# Patient Record
Sex: FEMALE | Race: WHITE | NOT HISPANIC OR LATINO | Employment: UNEMPLOYED | URBAN - METROPOLITAN AREA
[De-identification: names, ages, dates, MRNs, and addresses within clinical notes are randomized per-mention and may not be internally consistent; named-entity substitution may affect disease eponyms.]

---

## 2017-11-09 ENCOUNTER — HOSPITAL ENCOUNTER (EMERGENCY)
Facility: HOSPITAL | Age: 27
Discharge: HOME | End: 2017-11-10
Attending: EMERGENCY MEDICINE | Admitting: EMERGENCY MEDICINE

## 2017-11-09 DIAGNOSIS — R07.89 CHEST WALL PAIN: Primary | ICD-10-CM

## 2017-11-09 DIAGNOSIS — J20.9 ACUTE BRONCHITIS, UNSPECIFIED ORGANISM: ICD-10-CM

## 2017-11-09 LAB
ALBUMIN SERPL-MCNC: 4.4 G/DL (ref 3.2–4.8)
ALBUMIN/GLOB SERPL: 1.6 G/DL (ref 1.5–2.5)
ALP SERPL-CCNC: 68 U/L (ref 25–100)
ALT SERPL W P-5'-P-CCNC: 13 U/L (ref 7–40)
ANION GAP SERPL CALCULATED.3IONS-SCNC: 3 MMOL/L (ref 3–11)
AST SERPL-CCNC: 19 U/L (ref 0–33)
BASOPHILS # BLD AUTO: 0.02 10*3/MM3 (ref 0–0.2)
BASOPHILS NFR BLD AUTO: 0.2 % (ref 0–1)
BILIRUB SERPL-MCNC: 0.3 MG/DL (ref 0.3–1.2)
BUN BLD-MCNC: 12 MG/DL (ref 9–23)
BUN/CREAT SERPL: 15 (ref 7–25)
CALCIUM SPEC-SCNC: 9.2 MG/DL (ref 8.7–10.4)
CHLORIDE SERPL-SCNC: 106 MMOL/L (ref 99–109)
CO2 SERPL-SCNC: 29 MMOL/L (ref 20–31)
CREAT BLD-MCNC: 0.8 MG/DL (ref 0.6–1.3)
DEPRECATED RDW RBC AUTO: 46 FL (ref 37–54)
EOSINOPHIL # BLD AUTO: 0.3 10*3/MM3 (ref 0–0.3)
EOSINOPHIL NFR BLD AUTO: 2.4 % (ref 0–3)
ERYTHROCYTE [DISTWIDTH] IN BLOOD BY AUTOMATED COUNT: 13.3 % (ref 11.3–14.5)
GFR SERPL CREATININE-BSD FRML MDRD: 87 ML/MIN/1.73
GLOBULIN UR ELPH-MCNC: 2.7 GM/DL
GLUCOSE BLD-MCNC: 89 MG/DL (ref 70–100)
HCT VFR BLD AUTO: 46.1 % (ref 34.5–44)
HGB BLD-MCNC: 15.6 G/DL (ref 11.5–15.5)
HOLD SPECIMEN: NORMAL
HOLD SPECIMEN: NORMAL
IMM GRANULOCYTES # BLD: 0.02 10*3/MM3 (ref 0–0.03)
IMM GRANULOCYTES NFR BLD: 0.2 % (ref 0–0.6)
LYMPHOCYTES # BLD AUTO: 5.45 10*3/MM3 (ref 0.6–4.8)
LYMPHOCYTES NFR BLD AUTO: 44.5 % (ref 24–44)
MCH RBC QN AUTO: 31.7 PG (ref 27–31)
MCHC RBC AUTO-ENTMCNC: 33.8 G/DL (ref 32–36)
MCV RBC AUTO: 93.7 FL (ref 80–99)
MONOCYTES # BLD AUTO: 0.93 10*3/MM3 (ref 0–1)
MONOCYTES NFR BLD AUTO: 7.6 % (ref 0–12)
NEUTROPHILS # BLD AUTO: 5.54 10*3/MM3 (ref 1.5–8.3)
NEUTROPHILS NFR BLD AUTO: 45.1 % (ref 41–71)
PLATELET # BLD AUTO: 287 10*3/MM3 (ref 150–450)
PMV BLD AUTO: 10.3 FL (ref 6–12)
POTASSIUM BLD-SCNC: 4.2 MMOL/L (ref 3.5–5.5)
PROT SERPL-MCNC: 7.1 G/DL (ref 5.7–8.2)
RBC # BLD AUTO: 4.92 10*6/MM3 (ref 3.89–5.14)
SODIUM BLD-SCNC: 138 MMOL/L (ref 132–146)
WBC NRBC COR # BLD: 12.26 10*3/MM3 (ref 3.5–10.8)
WHOLE BLOOD HOLD SPECIMEN: NORMAL
WHOLE BLOOD HOLD SPECIMEN: NORMAL

## 2017-11-09 PROCEDURE — 99284 EMERGENCY DEPT VISIT MOD MDM: CPT

## 2017-11-09 PROCEDURE — 93005 ELECTROCARDIOGRAM TRACING: CPT

## 2017-11-09 PROCEDURE — 80053 COMPREHEN METABOLIC PANEL: CPT | Performed by: EMERGENCY MEDICINE

## 2017-11-09 PROCEDURE — 85025 COMPLETE CBC W/AUTO DIFF WBC: CPT | Performed by: EMERGENCY MEDICINE

## 2017-11-09 RX ORDER — SODIUM CHLORIDE 0.9 % (FLUSH) 0.9 %
10 SYRINGE (ML) INJECTION AS NEEDED
Status: DISCONTINUED | OUTPATIENT
Start: 2017-11-09 | End: 2017-11-10 | Stop reason: HOSPADM

## 2017-11-10 ENCOUNTER — APPOINTMENT (OUTPATIENT)
Dept: GENERAL RADIOLOGY | Facility: HOSPITAL | Age: 27
End: 2017-11-10

## 2017-11-10 VITALS
HEIGHT: 67 IN | DIASTOLIC BLOOD PRESSURE: 82 MMHG | WEIGHT: 293 LBS | SYSTOLIC BLOOD PRESSURE: 146 MMHG | TEMPERATURE: 98.5 F | RESPIRATION RATE: 18 BRPM | BODY MASS INDEX: 45.99 KG/M2 | OXYGEN SATURATION: 96 % | HEART RATE: 87 BPM

## 2017-11-10 LAB
B-HCG UR QL: NEGATIVE
BILIRUB UR QL STRIP: NEGATIVE
CLARITY UR: CLEAR
COLOR UR: YELLOW
GLUCOSE UR STRIP-MCNC: NEGATIVE MG/DL
HGB UR QL STRIP.AUTO: NEGATIVE
INTERNAL NEGATIVE CONTROL: NEGATIVE
INTERNAL POSITIVE CONTROL: POSITIVE
KETONES UR QL STRIP: NEGATIVE
LEUKOCYTE ESTERASE UR QL STRIP.AUTO: NEGATIVE
Lab: NORMAL
NITRITE UR QL STRIP: NEGATIVE
PH UR STRIP.AUTO: 7 [PH] (ref 5–8)
PROT UR QL STRIP: NEGATIVE
SP GR UR STRIP: 1.02 (ref 1–1.03)
UROBILINOGEN UR QL STRIP: NORMAL

## 2017-11-10 PROCEDURE — 71020 HC CHEST PA AND LATERAL: CPT

## 2017-11-10 PROCEDURE — 96374 THER/PROPH/DIAG INJ IV PUSH: CPT

## 2017-11-10 PROCEDURE — 25010000002 KETOROLAC TROMETHAMINE PER 15 MG: Performed by: PHYSICIAN ASSISTANT

## 2017-11-10 PROCEDURE — 81003 URINALYSIS AUTO W/O SCOPE: CPT | Performed by: EMERGENCY MEDICINE

## 2017-11-10 RX ORDER — KETOROLAC TROMETHAMINE 15 MG/ML
15 INJECTION, SOLUTION INTRAMUSCULAR; INTRAVENOUS ONCE
Status: COMPLETED | OUTPATIENT
Start: 2017-11-10 | End: 2017-11-10

## 2017-11-10 RX ORDER — AZITHROMYCIN 250 MG/1
500 TABLET, FILM COATED ORAL DAILY
Qty: 6 TABLET | Refills: 0 | Status: SHIPPED | OUTPATIENT
Start: 2017-11-10 | End: 2021-03-29

## 2017-11-10 RX ORDER — NAPROXEN 500 MG/1
500 TABLET ORAL 2 TIMES DAILY PRN
Qty: 14 TABLET | Refills: 0 | Status: SHIPPED | OUTPATIENT
Start: 2017-11-10 | End: 2021-03-29

## 2017-11-10 RX ADMIN — KETOROLAC TROMETHAMINE 15 MG: 15 INJECTION, SOLUTION INTRAMUSCULAR; INTRAVENOUS at 01:12

## 2017-11-10 NOTE — ED PROVIDER NOTES
Subjective   HPI Comments: Geeta Arteaga is a 27 y.o. female who presents to the ED with c/o substernal chest pain with onset today. The patient reports that the pain radiates into her left chest and it is described as a heavy and sharp sensation. She notes that she had a similar episode 1 week ago, but this was resolved after she took a nap. The pain is unaffected with deep breathing and she also complains of associated shortness of breath which is exacerbated with exertion. She states that the pain was mostly resolved upon examination. She also complains of a cough that produces clear white material, rhinorrhea, and congestion. She has tried taking OTC congestion medication with moderate relief of these symptoms. She denies any abdominal pain, nausea, urinary sx, problems with her bowel movements, or any other complaints at this time. The patient has no hx of asthma, but states that she does smoke 1 pack a day. She has not gotten her flu shot.    Patient is a 27 y.o. female presenting with chest pain.   History provided by:  Patient  Chest Pain   Pain location:  Substernal area  Pain radiates to:  Does not radiate  Onset quality:  Sudden  Duration: Onset tonight.  Timing:  Constant  Progression:  Partially resolved  Chronicity:  New  Relieved by:  None tried  Worsened by:  Nothing  Ineffective treatments:  None tried  Associated symptoms: cough (Productive), dizziness and shortness of breath    Associated symptoms: no abdominal pain, no fever and no nausea    Risk factors: smoking        Review of Systems   Constitutional: Negative for chills and fever.   HENT: Positive for congestion and rhinorrhea.    Respiratory: Positive for cough (Productive) and shortness of breath.    Cardiovascular: Positive for chest pain.   Gastrointestinal: Negative for abdominal pain, constipation, diarrhea and nausea.   Genitourinary: Negative for difficulty urinating, dysuria, frequency and urgency.   Neurological: Positive for  dizziness.   All other systems reviewed and are negative.      Past Medical History:   Diagnosis Date   • PCOS (polycystic ovarian syndrome)        No Known Allergies    History reviewed. No pertinent surgical history.    History reviewed. No pertinent family history.    Social History     Social History   • Marital status: Single     Spouse name: N/A   • Number of children: N/A   • Years of education: N/A     Social History Main Topics   • Smoking status: Current Every Day Smoker     Packs/day: 1.00     Types: Cigarettes   • Smokeless tobacco: Never Used   • Alcohol use No   • Drug use: No   • Sexual activity: Not Asked     Other Topics Concern   • None     Social History Narrative   • None         Objective   Physical Exam   Constitutional: She is oriented to person, place, and time. She appears well-developed and well-nourished. No distress.   Patient had poor hygiene   HENT:   Head: Normocephalic and atraumatic.   Right Ear: External ear normal.   Left Ear: External ear normal.   Nose: Nose normal. No sinus tenderness. Right sinus exhibits no maxillary sinus tenderness and no frontal sinus tenderness. Left sinus exhibits no maxillary sinus tenderness and no frontal sinus tenderness.   Mouth/Throat: Oropharynx is clear and moist.   Eyes: Conjunctivae are normal. Pupils are equal, round, and reactive to light. No scleral icterus.   Neck: Normal range of motion. Neck supple.   Cardiovascular: Normal rate, regular rhythm and normal heart sounds.    No murmur heard.  Pulmonary/Chest: Effort normal and breath sounds normal. No respiratory distress. She has no wheezes. She has no rhonchi. She has no rales. She exhibits tenderness (Point tenderness to the substernal region).   Lungs are clear bilaterally to ausculation. Chest wall has no skin lesions or rash.    Abdominal: Soft. Bowel sounds are normal.   Musculoskeletal: Normal range of motion. She exhibits no edema.   Neurological: She is alert and oriented to person,  place, and time.   Skin: Skin is warm and dry.   Psychiatric: She has a normal mood and affect. Her behavior is normal.   Nursing note and vitals reviewed.      Procedures         ED Course  ED Course   Comment By Time   Patient reports that Toradol did help improve her chest wall pain.  She also had a deep, loose cough after I examined her.  With her being a smoker and having 1 week history of congestion, I'm going to prescribe her a Z-Edward for acute bronchitis.  Zithromax will also help due to its anti-inflammatory properties. Radha Quintero PA-C 11/10 4719     Recent Results (from the past 24 hour(s))   Comprehensive Metabolic Panel    Collection Time: 11/09/17 10:42 PM   Result Value Ref Range    Glucose 89 70 - 100 mg/dL    BUN 12 9 - 23 mg/dL    Creatinine 0.80 0.60 - 1.30 mg/dL    Sodium 138 132 - 146 mmol/L    Potassium 4.2 3.5 - 5.5 mmol/L    Chloride 106 99 - 109 mmol/L    CO2 29.0 20.0 - 31.0 mmol/L    Calcium 9.2 8.7 - 10.4 mg/dL    Total Protein 7.1 5.7 - 8.2 g/dL    Albumin 4.40 3.20 - 4.80 g/dL    ALT (SGPT) 13 7 - 40 U/L    AST (SGOT) 19 0 - 33 U/L    Alkaline Phosphatase 68 25 - 100 U/L    Total Bilirubin 0.3 0.3 - 1.2 mg/dL    eGFR Non African Amer 87 >60 mL/min/1.73    Globulin 2.7 gm/dL    A/G Ratio 1.6 1.5 - 2.5 g/dL    BUN/Creatinine Ratio 15.0 7.0 - 25.0    Anion Gap 3.0 3.0 - 11.0 mmol/L   Light Blue Top    Collection Time: 11/09/17 10:42 PM   Result Value Ref Range    Extra Tube hold for add-on    Green Top (Gel)    Collection Time: 11/09/17 10:42 PM   Result Value Ref Range    Extra Tube Hold for add-ons.    Lavender Top    Collection Time: 11/09/17 10:42 PM   Result Value Ref Range    Extra Tube hold for add-on    Gold Top - SST    Collection Time: 11/09/17 10:42 PM   Result Value Ref Range    Extra Tube Hold for add-ons.    CBC Auto Differential    Collection Time: 11/09/17 10:42 PM   Result Value Ref Range    WBC 12.26 (H) 3.50 - 10.80 10*3/mm3    RBC 4.92 3.89 - 5.14 10*6/mm3     Hemoglobin 15.6 (H) 11.5 - 15.5 g/dL    Hematocrit 46.1 (H) 34.5 - 44.0 %    MCV 93.7 80.0 - 99.0 fL    MCH 31.7 (H) 27.0 - 31.0 pg    MCHC 33.8 32.0 - 36.0 g/dL    RDW 13.3 11.3 - 14.5 %    RDW-SD 46.0 37.0 - 54.0 fl    MPV 10.3 6.0 - 12.0 fL    Platelets 287 150 - 450 10*3/mm3    Neutrophil % 45.1 41.0 - 71.0 %    Lymphocyte % 44.5 (H) 24.0 - 44.0 %    Monocyte % 7.6 0.0 - 12.0 %    Eosinophil % 2.4 0.0 - 3.0 %    Basophil % 0.2 0.0 - 1.0 %    Immature Grans % 0.2 0.0 - 0.6 %    Neutrophils, Absolute 5.54 1.50 - 8.30 10*3/mm3    Lymphocytes, Absolute 5.45 (H) 0.60 - 4.80 10*3/mm3    Monocytes, Absolute 0.93 0.00 - 1.00 10*3/mm3    Eosinophils, Absolute 0.30 0.00 - 0.30 10*3/mm3    Basophils, Absolute 0.02 0.00 - 0.20 10*3/mm3    Immature Grans, Absolute 0.02 0.00 - 0.03 10*3/mm3   Urinalysis With / Culture If Indicated - Urine, Clean Catch    Collection Time: 11/10/17 12:43 AM   Result Value Ref Range    Color, UA Yellow Yellow, Straw    Appearance, UA Clear Clear    pH, UA 7.0 5.0 - 8.0    Specific Gravity, UA 1.020 1.001 - 1.030    Glucose, UA Negative Negative    Ketones, UA Negative Negative    Bilirubin, UA Negative Negative    Blood, UA Negative Negative    Protein, UA Negative Negative    Leuk Esterase, UA Negative Negative    Nitrite, UA Negative Negative    Urobilinogen, UA 1.0 E.U./dL 0.2 - 1.0 E.U./dL   POCT pregnancy, urine    Collection Time: 11/10/17 12:48 AM   Result Value Ref Range    HCG, Urine, QL Negative Negative    Lot Number [id number]     Internal Positive Control Positive     Internal Negative Control Negative      Note: In addition to lab results from this visit, the labs listed above may include labs taken at another facility or during a different encounter within the last 24 hours. Please correlate lab times with ED admission and discharge times for further clarification of the services performed during this visit.    XR Chest 2 View   Final Result      1. No acute findings.       2. Non-acute findings are described above.      THIS DOCUMENT HAS BEEN ELECTRONICALLY SIGNED BY DESTINEE AYON MD        Vitals:    11/09/17 2354 11/10/17 0000 11/10/17 0003 11/10/17 0100   BP:  125/68  146/82   BP Location:       Patient Position:       Pulse: 87  87    Resp:       Temp:       TempSrc:       SpO2: 96%  96%    Weight:       Height:         Medications   sodium chloride 0.9 % flush 10 mL (not administered)   ketorolac (TORADOL) injection 15 mg (15 mg Intravenous Given 11/10/17 0112)     ECG/EMG Results (last 24 hours)     Procedure Component Value Units Date/Time    ECG 12 Lead [[id number]] Collected:  11/09/17 2238     Updated:  11/09/17 2239                        MDM    Final diagnoses:   Chest wall pain   Acute bronchitis, unspecified organism       Documentation assistance provided by phyllis Murray.  Information recorded by the scribe was done at my direction and has been verified and validated by me.     Fredis Murray  11/10/17 0121       Fredis Murray  11/10/17 0121       Radha Quintero PA-C  11/10/17 0238

## 2017-11-10 NOTE — DISCHARGE INSTRUCTIONS
Chest x-ray shows no evidence of acute pneumonia.  Patient has had respiratory symptoms ×1 week duration.  With deep, loose cough, as well as tobacco dependence, we are going to cover patient with a Z-Edward for acute bronchitis.  The Zithromax will also help due to its anti-inflammatory properties.  I also prescribed naproxen 500 mg by mouth twice daily for chest wall pain.  Recommend close follow-up with primary care physician for recheck in 1-2 days.  We will give patient a list of excepting physicians in the local area.  Return if worsening symptoms.

## 2020-11-19 ENCOUNTER — HOSPITAL ENCOUNTER (EMERGENCY)
Facility: HOSPITAL | Age: 30
Discharge: HOME | End: 2020-11-19
Attending: EMERGENCY MEDICINE | Admitting: EMERGENCY MEDICINE

## 2020-11-19 ENCOUNTER — APPOINTMENT (OUTPATIENT)
Dept: GENERAL RADIOLOGY | Facility: HOSPITAL | Age: 30
End: 2020-11-19

## 2020-11-19 VITALS
HEART RATE: 78 BPM | OXYGEN SATURATION: 98 % | SYSTOLIC BLOOD PRESSURE: 152 MMHG | TEMPERATURE: 98.9 F | BODY MASS INDEX: 44.41 KG/M2 | HEIGHT: 68 IN | DIASTOLIC BLOOD PRESSURE: 70 MMHG | WEIGHT: 293 LBS | RESPIRATION RATE: 18 BRPM

## 2020-11-19 DIAGNOSIS — B34.9 VIRAL SYNDROME: Primary | ICD-10-CM

## 2020-11-19 LAB
ALBUMIN SERPL-MCNC: 4.2 G/DL (ref 3.5–5.2)
ALBUMIN/GLOB SERPL: 1.8 G/DL
ALP SERPL-CCNC: 69 U/L (ref 39–117)
ALT SERPL W P-5'-P-CCNC: 9 U/L (ref 1–33)
ANION GAP SERPL CALCULATED.3IONS-SCNC: 10 MMOL/L (ref 5–15)
AST SERPL-CCNC: 15 U/L (ref 1–32)
B PARAPERT DNA SPEC QL NAA+PROBE: NOT DETECTED
B PERT DNA SPEC QL NAA+PROBE: NOT DETECTED
B-HCG UR QL: NEGATIVE
BASOPHILS # BLD AUTO: 0.03 10*3/MM3 (ref 0–0.2)
BASOPHILS NFR BLD AUTO: 0.3 % (ref 0–1.5)
BILIRUB SERPL-MCNC: 0.4 MG/DL (ref 0–1.2)
BUN SERPL-MCNC: 10 MG/DL (ref 6–20)
BUN/CREAT SERPL: 13.2 (ref 7–25)
C PNEUM DNA NPH QL NAA+NON-PROBE: NOT DETECTED
CALCIUM SPEC-SCNC: 9.3 MG/DL (ref 8.6–10.5)
CHLORIDE SERPL-SCNC: 103 MMOL/L (ref 98–107)
CO2 SERPL-SCNC: 27 MMOL/L (ref 22–29)
CREAT SERPL-MCNC: 0.76 MG/DL (ref 0.57–1)
DEPRECATED RDW RBC AUTO: 43.8 FL (ref 37–54)
EOSINOPHIL # BLD AUTO: 0.24 10*3/MM3 (ref 0–0.4)
EOSINOPHIL NFR BLD AUTO: 2.3 % (ref 0.3–6.2)
ERYTHROCYTE [DISTWIDTH] IN BLOOD BY AUTOMATED COUNT: 12.8 % (ref 12.3–15.4)
FLUAV H1 2009 PAND RNA NPH QL NAA+PROBE: NOT DETECTED
FLUAV H1 HA GENE NPH QL NAA+PROBE: NOT DETECTED
FLUAV H3 RNA NPH QL NAA+PROBE: NOT DETECTED
FLUAV SUBTYP SPEC NAA+PROBE: NOT DETECTED
FLUBV RNA ISLT QL NAA+PROBE: NOT DETECTED
GFR SERPL CREATININE-BSD FRML MDRD: 91 ML/MIN/1.73
GLOBULIN UR ELPH-MCNC: 2.4 GM/DL
GLUCOSE SERPL-MCNC: 121 MG/DL (ref 65–99)
HADV DNA SPEC NAA+PROBE: NOT DETECTED
HCOV 229E RNA SPEC QL NAA+PROBE: NOT DETECTED
HCOV HKU1 RNA SPEC QL NAA+PROBE: NOT DETECTED
HCOV NL63 RNA SPEC QL NAA+PROBE: NOT DETECTED
HCOV OC43 RNA SPEC QL NAA+PROBE: NOT DETECTED
HCT VFR BLD AUTO: 47.9 % (ref 34–46.6)
HGB BLD-MCNC: 15.7 G/DL (ref 12–15.9)
HMPV RNA NPH QL NAA+NON-PROBE: NOT DETECTED
HOLD SPECIMEN: NORMAL
HOLD SPECIMEN: NORMAL
HPIV1 RNA SPEC QL NAA+PROBE: NOT DETECTED
HPIV2 RNA SPEC QL NAA+PROBE: NOT DETECTED
HPIV3 RNA NPH QL NAA+PROBE: NOT DETECTED
HPIV4 P GENE NPH QL NAA+PROBE: NOT DETECTED
IMM GRANULOCYTES # BLD AUTO: 0.03 10*3/MM3 (ref 0–0.05)
IMM GRANULOCYTES NFR BLD AUTO: 0.3 % (ref 0–0.5)
INTERNAL NEGATIVE CONTROL: NEGATIVE
INTERNAL POSITIVE CONTROL: POSITIVE
LYMPHOCYTES # BLD AUTO: 3.24 10*3/MM3 (ref 0.7–3.1)
LYMPHOCYTES NFR BLD AUTO: 31.4 % (ref 19.6–45.3)
Lab: NORMAL
M PNEUMO IGG SER IA-ACNC: NOT DETECTED
MCH RBC QN AUTO: 30.7 PG (ref 26.6–33)
MCHC RBC AUTO-ENTMCNC: 32.8 G/DL (ref 31.5–35.7)
MCV RBC AUTO: 93.6 FL (ref 79–97)
MONOCYTES # BLD AUTO: 0.65 10*3/MM3 (ref 0.1–0.9)
MONOCYTES NFR BLD AUTO: 6.3 % (ref 5–12)
NEUTROPHILS NFR BLD AUTO: 59.4 % (ref 42.7–76)
NEUTROPHILS NFR BLD AUTO: 6.12 10*3/MM3 (ref 1.7–7)
NRBC BLD AUTO-RTO: 0 /100 WBC (ref 0–0.2)
NT-PROBNP SERPL-MCNC: <5 PG/ML (ref 0–450)
PLATELET # BLD AUTO: 247 10*3/MM3 (ref 140–450)
PMV BLD AUTO: 10.1 FL (ref 6–12)
POTASSIUM SERPL-SCNC: 4.3 MMOL/L (ref 3.5–5.2)
PROT SERPL-MCNC: 6.6 G/DL (ref 6–8.5)
RBC # BLD AUTO: 5.12 10*6/MM3 (ref 3.77–5.28)
RHINOVIRUS RNA SPEC NAA+PROBE: NOT DETECTED
RSV RNA NPH QL NAA+NON-PROBE: NOT DETECTED
SARS-COV-2 RNA NPH QL NAA+NON-PROBE: NOT DETECTED
SODIUM SERPL-SCNC: 140 MMOL/L (ref 136–145)
TROPONIN T SERPL-MCNC: <0.01 NG/ML (ref 0–0.03)
WBC # BLD AUTO: 10.31 10*3/MM3 (ref 3.4–10.8)
WHOLE BLOOD HOLD SPECIMEN: NORMAL
WHOLE BLOOD HOLD SPECIMEN: NORMAL

## 2020-11-19 PROCEDURE — 25010000002 KETOROLAC TROMETHAMINE PER 15 MG: Performed by: EMERGENCY MEDICINE

## 2020-11-19 PROCEDURE — 80053 COMPREHEN METABOLIC PANEL: CPT

## 2020-11-19 PROCEDURE — 71045 X-RAY EXAM CHEST 1 VIEW: CPT

## 2020-11-19 PROCEDURE — 81025 URINE PREGNANCY TEST: CPT

## 2020-11-19 PROCEDURE — 99284 EMERGENCY DEPT VISIT MOD MDM: CPT

## 2020-11-19 PROCEDURE — 93005 ELECTROCARDIOGRAM TRACING: CPT

## 2020-11-19 PROCEDURE — 84484 ASSAY OF TROPONIN QUANT: CPT

## 2020-11-19 PROCEDURE — 83880 ASSAY OF NATRIURETIC PEPTIDE: CPT

## 2020-11-19 PROCEDURE — 85025 COMPLETE CBC W/AUTO DIFF WBC: CPT

## 2020-11-19 PROCEDURE — 96374 THER/PROPH/DIAG INJ IV PUSH: CPT

## 2020-11-19 PROCEDURE — 0202U NFCT DS 22 TRGT SARS-COV-2: CPT | Performed by: EMERGENCY MEDICINE

## 2020-11-19 RX ORDER — SODIUM CHLORIDE 0.9 % (FLUSH) 0.9 %
10 SYRINGE (ML) INJECTION AS NEEDED
Status: DISCONTINUED | OUTPATIENT
Start: 2020-11-19 | End: 2020-11-19 | Stop reason: HOSPADM

## 2020-11-19 RX ORDER — KETOROLAC TROMETHAMINE 30 MG/ML
30 INJECTION, SOLUTION INTRAMUSCULAR; INTRAVENOUS ONCE
Status: COMPLETED | OUTPATIENT
Start: 2020-11-19 | End: 2020-11-19

## 2020-11-19 RX ORDER — HYDROCODONE BITARTRATE AND ACETAMINOPHEN 7.5; 325 MG/1; MG/1
1 TABLET ORAL ONCE
Status: COMPLETED | OUTPATIENT
Start: 2020-11-19 | End: 2020-11-19

## 2020-11-19 RX ADMIN — SODIUM CHLORIDE 1000 ML: 9 INJECTION, SOLUTION INTRAVENOUS at 09:56

## 2020-11-19 RX ADMIN — HYDROCODONE BITARTRATE AND ACETAMINOPHEN 1 TABLET: 7.5; 325 TABLET ORAL at 09:56

## 2020-11-19 RX ADMIN — KETOROLAC TROMETHAMINE 30 MG: 30 INJECTION, SOLUTION INTRAMUSCULAR at 09:56

## 2020-11-19 NOTE — DISCHARGE INSTRUCTIONS
Home to rest.  Maintain your very best hydration and nutrition.  Tylenol 650 mg every 4 hours for discomfort.  Below is a list of local providers with whom you may establish a relationship.  Thank you.

## 2020-11-19 NOTE — ED PROVIDER NOTES
Subjective   Geeta Arteaga is a 30 y.o. female who presents to the ED with c/o shortness of breath. The patient is concerned about being positive for COVID-19, as for the past week she has been ill with shortness of breath and myalgia. She reports having a fever of 101 last night with some associated chills, but she has been controlling her febrile temperature with Ibuprofen. The patient complains of rib pain when she coughs and diarrhea but denies loss of taste or smell, chest pain, nausea, vomiting, dysuria, hematochezia, and abdominal pain. She is a current everyday smoker, with approximately 1 pack of cigarettes daily smoked. There are no other acute complaints at this time.      History provided by:  Patient  Shortness of Breath  Severity:  Moderate  Onset quality:  Sudden  Duration:  1 week  Timing:  Constant  Progression:  Worsening  Chronicity:  New  Context: not emotional upset, not fumes and not occupational exposure    Relieved by:  Nothing  Worsened by:  Coughing and exertion  Ineffective treatments:  Rest  Associated symptoms: cough and fever    Associated symptoms: no abdominal pain, no chest pain and no vomiting    Risk factors: alcohol use and tobacco use    Risk factors: no hx of cancer, no hx of PE/DVT and no obesity        Review of Systems   Constitutional: Positive for chills and fever.   HENT:        She denies loss of taste or smell.   Respiratory: Positive for cough and shortness of breath.    Cardiovascular: Negative for chest pain.   Gastrointestinal: Positive for diarrhea. Negative for abdominal pain, blood in stool, nausea and vomiting.   Genitourinary: Negative for decreased urine volume, difficulty urinating, dysuria, frequency, hematuria and urgency.   Musculoskeletal: Positive for myalgias.        The patient complains of rib pain when coughing.   All other systems reviewed and are negative.      Past Medical History:   Diagnosis Date   • PCOS (polycystic ovarian syndrome)        No  Known Allergies    No past surgical history on file.    No family history on file.    Social History     Socioeconomic History   • Marital status:      Spouse name: Not on file   • Number of children: Not on file   • Years of education: Not on file   • Highest education level: Not on file   Tobacco Use   • Smoking status: Current Every Day Smoker     Packs/day: 1.00     Types: Cigarettes   • Smokeless tobacco: Never Used   Substance and Sexual Activity   • Alcohol use: Yes   • Drug use: No         Objective   Physical Exam  Vitals signs and nursing note reviewed.   Constitutional:       General: She is not in acute distress.     Appearance: She is well-developed.      Comments: The patient is an excellent historian.   HENT:      Head: Normocephalic and atraumatic.   Eyes:      General: No scleral icterus.     Conjunctiva/sclera: Conjunctivae normal.   Neck:      Musculoskeletal: Normal range of motion and neck supple.   Cardiovascular:      Rate and Rhythm: Normal rate and regular rhythm.      Heart sounds: Normal heart sounds. No murmur.   Pulmonary:      Effort: Pulmonary effort is normal. No respiratory distress.      Breath sounds: Normal breath sounds.      Comments: The patient is oxygenating well, with an oxygen saturation of 100% on room air.  Abdominal:      Palpations: Abdomen is soft.      Tenderness: There is no abdominal tenderness.   Musculoskeletal: Normal range of motion.   Skin:     General: Skin is warm and dry.   Neurological:      Mental Status: She is alert and oriented to person, place, and time.   Psychiatric:         Behavior: Behavior normal.         Procedures         ED Course  ED Course as of Nov 19 1112   Thu Nov 19, 2020   1101 Patient's work-up is reassuring and negative for acute findings.  Her vital signs have been stable throughout her ED course.  We discussed follow-up with her primary care provider and parameters for concern that would warrant return to the emergency  department.    [MS]      ED Course User Index  [MS] Susana Balbuena Yeni, EDIE     Recent Results (from the past 24 hour(s))   Comprehensive Metabolic Panel    Collection Time: 11/19/20  9:08 AM    Specimen: Blood   Result Value Ref Range    Glucose 121 (H) 65 - 99 mg/dL    BUN 10 6 - 20 mg/dL    Creatinine 0.76 0.57 - 1.00 mg/dL    Sodium 140 136 - 145 mmol/L    Potassium 4.3 3.5 - 5.2 mmol/L    Chloride 103 98 - 107 mmol/L    CO2 27.0 22.0 - 29.0 mmol/L    Calcium 9.3 8.6 - 10.5 mg/dL    Total Protein 6.6 6.0 - 8.5 g/dL    Albumin 4.20 3.50 - 5.20 g/dL    ALT (SGPT) 9 1 - 33 U/L    AST (SGOT) 15 1 - 32 U/L    Alkaline Phosphatase 69 39 - 117 U/L    Total Bilirubin 0.4 0.0 - 1.2 mg/dL    eGFR Non African Amer 91 >60 mL/min/1.73    Globulin 2.4 gm/dL    A/G Ratio 1.8 g/dL    BUN/Creatinine Ratio 13.2 7.0 - 25.0    Anion Gap 10.0 5.0 - 15.0 mmol/L   BNP    Collection Time: 11/19/20  9:08 AM    Specimen: Blood   Result Value Ref Range    proBNP <5.0 0.0 - 450.0 pg/mL   Light Blue Top    Collection Time: 11/19/20  9:08 AM   Result Value Ref Range    Extra Tube hold for add-on    Green Top (Gel)    Collection Time: 11/19/20  9:08 AM   Result Value Ref Range    Extra Tube Hold for add-ons.    Lavender Top    Collection Time: 11/19/20  9:08 AM   Result Value Ref Range    Extra Tube hold for add-on    Gold Top - SST    Collection Time: 11/19/20  9:08 AM   Result Value Ref Range    Extra Tube Hold for add-ons.    CBC Auto Differential    Collection Time: 11/19/20  9:08 AM    Specimen: Blood   Result Value Ref Range    WBC 10.31 3.40 - 10.80 10*3/mm3    RBC 5.12 3.77 - 5.28 10*6/mm3    Hemoglobin 15.7 12.0 - 15.9 g/dL    Hematocrit 47.9 (H) 34.0 - 46.6 %    MCV 93.6 79.0 - 97.0 fL    MCH 30.7 26.6 - 33.0 pg    MCHC 32.8 31.5 - 35.7 g/dL    RDW 12.8 12.3 - 15.4 %    RDW-SD 43.8 37.0 - 54.0 fl    MPV 10.1 6.0 - 12.0 fL    Platelets 247 140 - 450 10*3/mm3    Neutrophil % 59.4 42.7 - 76.0 %    Lymphocyte % 31.4 19.6 - 45.3 %     Monocyte % 6.3 5.0 - 12.0 %    Eosinophil % 2.3 0.3 - 6.2 %    Basophil % 0.3 0.0 - 1.5 %    Immature Grans % 0.3 0.0 - 0.5 %    Neutrophils, Absolute 6.12 1.70 - 7.00 10*3/mm3    Lymphocytes, Absolute 3.24 (H) 0.70 - 3.10 10*3/mm3    Monocytes, Absolute 0.65 0.10 - 0.90 10*3/mm3    Eosinophils, Absolute 0.24 0.00 - 0.40 10*3/mm3    Basophils, Absolute 0.03 0.00 - 0.20 10*3/mm3    Immature Grans, Absolute 0.03 0.00 - 0.05 10*3/mm3    nRBC 0.0 0.0 - 0.2 /100 WBC   Troponin    Collection Time: 11/19/20 10:01 AM    Specimen: Blood   Result Value Ref Range    Troponin T <0.010 0.000 - 0.030 ng/mL   Respiratory Panel PCR w/COVID-19(SARS-CoV-2) [facility]/FELI In-House, NP Swab in [facility], 3-4 HR TAT - Swab, Nasopharynx    Collection Time: 11/19/20 10:01 AM    Specimen: Nasopharynx; Swab   Result Value Ref Range    ADENOVIRUS, PCR Not Detected Not Detected    Coronavirus 229E Not Detected Not Detected    Coronavirus HKU1 Not Detected Not Detected    Coronavirus NL63 Not Detected Not Detected    Coronavirus OC43 Not Detected Not Detected    COVID19 Not Detected Not Detected - Ref. Range    Human Metapneumovirus Not Detected Not Detected    Human Rhinovirus/Enterovirus Not Detected Not Detected    Influenza A PCR Not Detected Not Detected    Influenza A H1 Not Detected Not Detected    Influenza A H1 2009 PCR Not Detected Not Detected    Influenza A H3 Not Detected Not Detected    Influenza B PCR Not Detected Not Detected    Parainfluenza Virus 1 Not Detected Not Detected    Parainfluenza Virus 2 Not Detected Not Detected    Parainfluenza Virus 3 Not Detected Not Detected    Parainfluenza Virus 4 Not Detected Not Detected    RSV, PCR Not Detected Not Detected    Bordetella pertussis pcr Not Detected Not Detected    Bordetella parapertussis PCR Not Detected Not Detected    Chlamydophila pneumoniae PCR Not Detected Not Detected    Mycoplasma pneumo by PCR Not Detected Not Detected   POCT pregnancy, urine     Collection Time: 11/19/20 10:09 AM    Specimen: Urine   Result Value Ref Range    HCG, Urine, QL Negative Negative    Lot Number [id number]     Internal Positive Control Positive     Internal Negative Control Negative      Note: In addition to lab results from this visit, the labs listed above may include labs taken at another facility or during a different encounter within the last 24 hours. Please correlate lab times with ED admission and discharge times for further clarification of the services performed during this visit.    XR Chest 1 View   Preliminary Result   No focal consolidation or opacification. No pleural   effusion.       D:  11/19/2020   E:  11/19/2020                Vitals:    11/19/20 0859 11/19/20 0909 11/19/20 1000 11/19/20 1030   BP:  132/81 152/86 152/70   BP Location:       Patient Position:       Pulse:  90 81 78   Resp:   18 18   Temp: 98.9 °F (37.2 °C)      TempSrc: Oral      SpO2:  94% 100% 98%   Weight:       Height:         Medications   sodium chloride 0.9 % flush 10 mL (has no administration in time range)   sodium chloride 0.9 % bolus 1,000 mL (1,000 mL Intravenous New Bag 11/19/20 0956)   HYDROcodone-acetaminophen (NORCO) 7.5-325 MG per tablet 1 tablet (1 tablet Oral Given 11/19/20 0956)   ketorolac (TORADOL) injection 30 mg (30 mg Intravenous Given 11/19/20 0956)     ECG/EMG Results (last 24 hours)     Procedure Component Value Units Date/Time    ECG 12 Lead [[id number]] Collected: 11/19/20 0903     Updated: 11/19/20 0913        ECG 12 Lead             DISCHARGE    Patient discharged in stable condition.    Reviewed implications of results, diagnosis, meds, responsibility to follow up, warning signs and symptoms of possible worsening, potential complications and reasons to return to ER.    Patient/Family voiced understanding of above instructions.    Discussed plan for discharge, as there is no emergent indication for admission.  Pt/family is agreeable and understands need for follow  up and possible repeat testing.  Pt/family is aware that discharge does not mean that nothing is wrong but that it indicates no emergency is currently present that requires admission and they must continue care with follow-up as given below or with a physician of their choice.     FOLLOW-UP  No follow-up provider specified.       Medication List      No changes were made to your prescriptions during this visit.                                              MDM    Final diagnoses:   Viral syndrome       Documentation assistance provided by phyllis Espinoza.  Information recorded by the phyllis was done at my direction and has been verified and validated by me.     Genaro Espinoza  11/19/20 0948       Susana Balbuena, EDIE  11/19/20 1111       Susana Balbuena, EDIE  11/19/20 1112

## 2020-11-20 LAB
QT INTERVAL: 358 MS
QTC INTERVAL: 440 MS

## 2021-03-29 ENCOUNTER — OFFICE VISIT (OUTPATIENT)
Dept: OBSTETRICS AND GYNECOLOGY | Facility: CLINIC | Age: 31
End: 2021-03-29

## 2021-03-29 ENCOUNTER — LAB (OUTPATIENT)
Dept: LAB | Facility: HOSPITAL | Age: 31
End: 2021-03-29

## 2021-03-29 VITALS
BODY MASS INDEX: 45.99 KG/M2 | HEIGHT: 67 IN | WEIGHT: 293 LBS | DIASTOLIC BLOOD PRESSURE: 80 MMHG | SYSTOLIC BLOOD PRESSURE: 122 MMHG

## 2021-03-29 DIAGNOSIS — N92.6 IRREGULAR MENSES: ICD-10-CM

## 2021-03-29 DIAGNOSIS — Z01.419 ENCOUNTER FOR WELL WOMAN EXAM WITH ROUTINE GYNECOLOGICAL EXAM: Primary | ICD-10-CM

## 2021-03-29 PROBLEM — E66.01 MORBID OBESITY WITH BMI OF 50.0-59.9, ADULT: Status: ACTIVE | Noted: 2021-03-29

## 2021-03-29 LAB
PROLACTIN SERPL-MCNC: 7.55 NG/ML (ref 4.79–23.3)
TSH SERPL DL<=0.05 MIU/L-ACNC: 2.25 UIU/ML (ref 0.27–4.2)

## 2021-03-29 PROCEDURE — 84443 ASSAY THYROID STIM HORMONE: CPT | Performed by: OBSTETRICS & GYNECOLOGY

## 2021-03-29 PROCEDURE — 36415 COLL VENOUS BLD VENIPUNCTURE: CPT | Performed by: OBSTETRICS & GYNECOLOGY

## 2021-03-29 PROCEDURE — 84146 ASSAY OF PROLACTIN: CPT | Performed by: OBSTETRICS & GYNECOLOGY

## 2021-03-29 PROCEDURE — 99385 PREV VISIT NEW AGE 18-39: CPT | Performed by: OBSTETRICS & GYNECOLOGY

## 2021-03-29 RX ORDER — MEDROXYPROGESTERONE ACETATE 10 MG/1
10 TABLET ORAL DAILY
Qty: 12 TABLET | Refills: 3 | Status: SHIPPED | OUTPATIENT
Start: 2021-03-29 | End: 2021-03-31 | Stop reason: CLARIF
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
## 2021-03-30 RX ORDER — PNV NO.95/FERROUS FUM/FOLIC AC 28MG-0.8MG
1 TABLET ORAL DAILY
Qty: 30 TABLET | Refills: 11 | Status: SHIPPED | OUTPATIENT
Start: 2021-03-30 | End: 2022-05-03

## 2021-03-31 ENCOUNTER — TELEPHONE (OUTPATIENT)
Dept: OBSTETRICS AND GYNECOLOGY | Facility: CLINIC | Age: 31
End: 2021-03-31

## 2021-04-12 ENCOUNTER — TELEPHONE (OUTPATIENT)
Dept: OBSTETRICS AND GYNECOLOGY | Facility: CLINIC | Age: 31
End: 2021-04-12

## 2021-04-12 NOTE — TELEPHONE ENCOUNTER
PT WAS TOLD TO CALL WHEN SHE STARTED A CYCLE AND THAT WE WOULD CALL HER IN A PRESCRIPTION   PER DR NUNEZ

## 2021-04-12 NOTE — TELEPHONE ENCOUNTER
Let her know that I sent that and to take 1 a day cycle days 3 through 7, timed intercourse every other day starting on cycle day 12 through 18.

## 2021-04-13 ENCOUNTER — HOSPITAL ENCOUNTER (EMERGENCY)
Facility: HOSPITAL | Age: 31
Discharge: HOME | End: 2021-04-13
Attending: EMERGENCY MEDICINE | Admitting: EMERGENCY MEDICINE

## 2021-04-13 ENCOUNTER — APPOINTMENT (OUTPATIENT)
Dept: GENERAL RADIOLOGY | Facility: HOSPITAL | Age: 31
End: 2021-04-13

## 2021-04-13 VITALS
DIASTOLIC BLOOD PRESSURE: 98 MMHG | HEART RATE: 101 BPM | OXYGEN SATURATION: 96 % | HEIGHT: 68 IN | TEMPERATURE: 98.8 F | WEIGHT: 293 LBS | RESPIRATION RATE: 16 BRPM | BODY MASS INDEX: 44.41 KG/M2 | SYSTOLIC BLOOD PRESSURE: 180 MMHG

## 2021-04-13 DIAGNOSIS — S63.502A SPRAIN OF LEFT WRIST, INITIAL ENCOUNTER: Primary | ICD-10-CM

## 2021-04-13 DIAGNOSIS — R03.0 ELEVATED BLOOD PRESSURE READING WITHOUT DIAGNOSIS OF HYPERTENSION: ICD-10-CM

## 2021-04-13 DIAGNOSIS — S93.402A SPRAIN OF LEFT ANKLE, UNSPECIFIED LIGAMENT, INITIAL ENCOUNTER: ICD-10-CM

## 2021-04-13 DIAGNOSIS — E66.01 MORBID OBESITY WITH BMI OF 50.0-59.9, ADULT (HCC): ICD-10-CM

## 2021-04-13 PROCEDURE — 73610 X-RAY EXAM OF ANKLE: CPT

## 2021-04-13 PROCEDURE — 63710000001 ONDANSETRON PER 8 MG: Performed by: EMERGENCY MEDICINE

## 2021-04-13 PROCEDURE — 73110 X-RAY EXAM OF WRIST: CPT

## 2021-04-13 PROCEDURE — 99283 EMERGENCY DEPT VISIT LOW MDM: CPT

## 2021-04-13 RX ORDER — HYDROCODONE BITARTRATE AND ACETAMINOPHEN 7.5; 325 MG/1; MG/1
1 TABLET ORAL ONCE
Status: COMPLETED | OUTPATIENT
Start: 2021-04-13 | End: 2021-04-13

## 2021-04-13 RX ORDER — ONDANSETRON 4 MG/1
4 TABLET, FILM COATED ORAL ONCE
Status: COMPLETED | OUTPATIENT
Start: 2021-04-13 | End: 2021-04-13

## 2021-04-13 RX ADMIN — HYDROCODONE BITARTRATE AND ACETAMINOPHEN 1 TABLET: 7.5; 325 TABLET ORAL at 17:29

## 2021-04-13 RX ADMIN — ONDANSETRON HYDROCHLORIDE 4 MG: 4 TABLET, FILM COATED ORAL at 17:29

## 2021-04-13 NOTE — DISCHARGE INSTRUCTIONS
Home to rest.  Use the walker or crutches to limit weightbearing.  Use the Ace wrap to facilitate immobilization.  Ice to each joint: Every 2 hours during waking hours for 20 minutes for the next 24 hours.  Avoid aspirin-containing products. Tylenol and motrin as needed for discomfort.   If symptoms persist after 3 days, follow-up with orthopedic surgeon, Dr. Thacker.  His number has been provided.  Keep a written log of your blood pressure readings and confer together with your primary care provider.  A number has been provided below to help facilitate your pursuit of a primary provider.  Return to the emergency department as needed for worsening symptoms or concerns.  Thank you

## 2021-04-13 NOTE — ED PROVIDER NOTES
Shameka Palmer is a 21 Subjective   Ms. Geeta Arteaga is a 31 y.o. female who presents to the ED with c/o fall. Yesterday at 1400 she stepped into a hole and twisted her left ankle. She fell on her outstretched arm and injured her left wrist. She denies head, neck, or pelvis injury. She has abrasions on both knees. She has had no improvement with ice and elevation overnight. She denies neurosensory complaints. There are no other acute symptoms at this time.      Fall  Mechanism of injury: fall    Injury location: left ankle and left wrist.  Incident location:  Outdoors  Time since incident:  1 day  Arrived directly from scene: no    Fall:     Fall occurred:  Tripped  Protective equipment: none    Tetanus status:  Unknown  Prior to arrival data:     Loss of consciousness: no      Amnesic to event: no    Associated symptoms: no back pain, no headaches and no neck pain        Review of Systems   Musculoskeletal: Negative for back pain and neck pain.        Left ankle and left wrist pain.   Neurological: Negative for numbness and headaches.   All other systems reviewed and are negative.      Past Medical History:   Diagnosis Date   • Anxiety    • Depression    • PCOS (polycystic ovarian syndrome)        No Known Allergies    No past surgical history on file.    No family history on file.    Social History     Socioeconomic History   • Marital status:      Spouse name: Not on file   • Number of children: Not on file   • Years of education: Not on file   • Highest education level: Not on file   Tobacco Use   • Smoking status: Current Every Day Smoker     Packs/day: 1.00     Types: Cigarettes   • Smokeless tobacco: Never Used   Substance and Sexual Activity   • Alcohol use: Not Currently   • Drug use: No   • Sexual activity: Yes     Partners: Male     Birth control/protection: None         Objective   Physical Exam  Vitals and nursing note reviewed.   Constitutional:       General: She is not in acute distress.     Appearance: She is  well-developed.      Comments: Obese.   HENT:      Head: Normocephalic and atraumatic.      Nose: Nose normal.   Eyes:      General: No scleral icterus.     Conjunctiva/sclera: Conjunctivae normal.   Cardiovascular:      Rate and Rhythm: Normal rate and regular rhythm.      Heart sounds: Normal heart sounds. No murmur heard.     Pulmonary:      Effort: Pulmonary effort is normal. No respiratory distress.      Breath sounds: Normal breath sounds.   Abdominal:      Palpations: Abdomen is soft.      Tenderness: There is no abdominal tenderness.   Musculoskeletal:         General: Normal range of motion.      Cervical back: Normal range of motion and neck supple.      Comments: Spine is nontender. Pelvis is stable. Tenderness to left wrist with flexion and extension only. No soft tissue swelling. Full range of motion. Proximal and distal joints are negative. She has obvious soft tissue swelling to left lateral malleolus. This is tender to palpation. Full range of motion. Proximal and distal joints are negative. Right leg and right arm are atraumatic and unremarkable.    Skin:     General: Skin is warm and dry.   Neurological:      Mental Status: She is alert and oriented to person, place, and time.      Comments: Neurovascularly intact.   Psychiatric:         Behavior: Behavior normal.         Procedures         ED Course  ED Course as of Apr 13 1808   Tue Apr 13, 2021 1802 Patient's imaging is negative for acute findings.  She is hypertensive without history of hypertension.  This could potentially be due to her presenting discomfort.  We discussed the potential difficulty of using crutches while recovering from a left wrist injury.  Will provide crutches as well as a prescription for a walker as she recovers.  She prefers Tylenol Motrin for her discomfort.  She agrees to keep a written log of her blood pressure readings at home and follow-up with her primary care provider.  Referring her to Dr. Thacker for any  "persistent discomfort.  She understands and concurs with his outpatient plan of care with close follow-up    [MS]      ED Course User Index  [MS] Susana Balbuena Yeni, EDIE     No results found for this or any previous visit (from the past 24 hour(s)).  Note: In addition to lab results from this visit, the labs listed above may include labs taken at another facility or during a different encounter within the last 24 hours. Please correlate lab times with ED admission and discharge times for further clarification of the services performed during this visit.    XR Wrist 3+ View Left   Preliminary Result   No visible fracture.               XR Ankle 3+ View Left   Preliminary Result   No visible fracture.                 Vitals:    04/13/21 1701   BP: 180/98   Pulse: 101   Resp: 16   Temp: 98.8 °F (37.1 °C)   SpO2: 96%   Weight: (!) 150 kg (330 lb)   Height: 172.7 cm (68\")     Medications   HYDROcodone-acetaminophen (NORCO) 7.5-325 MG per tablet 1 tablet (1 tablet Oral Given 4/13/21 1729)   ondansetron (ZOFRAN) tablet 4 mg (4 mg Oral Given 4/13/21 1729)     ECG/EMG Results (last 24 hours)     ** No results found for the last 24 hours. **        No orders to display                                              MDM    Final diagnoses:   Sprain of left wrist, initial encounter   Sprain of left ankle, unspecified ligament, initial encounter   Elevated blood pressure reading without diagnosis of hypertension   Morbid obesity with BMI of 50.0-59.9, adult (CMS/[facility])     DISCHARGE    Patient discharged in stable condition.    Reviewed implications of results, diagnosis, meds, responsibility to follow up, warning signs and symptoms of possible worsening, potential complications and reasons to return to ER.    Patient/Family voiced understanding of above instructions.    Discussed plan for discharge, as there is no emergent indication for admission.  Pt/family is agreeable and understands need for follow up and possible repeat " testing.  Pt/family is aware that discharge does not mean that nothing is wrong but that it indicates no emergency is currently present that requires admission and they must continue care with follow-up as given below or with a physician of their choice.     FOLLOW-UP  Nito Thacker Jr., MD  [address]  [address]  [facility] [address]  [phone]          PATIENT CONNECTION - [place] [address]  [phone]             Medication List      No changes were made to your prescriptions during this visit.         Documentation assistance provided by phyllis Mata.  Information recorded by the phyllis was done at my direction and has been verified and validated by me.     Jorgito Mata  04/13/21 1738       Susana Balbuena APRN  04/13/21 1801         Jude Turner MD  3/16/2020

## 2021-05-03 ENCOUNTER — TELEPHONE (OUTPATIENT)
Dept: OBSTETRICS AND GYNECOLOGY | Facility: CLINIC | Age: 31
End: 2021-05-03

## 2021-05-03 NOTE — TELEPHONE ENCOUNTER
PT IS CALLING SHE TOOK Charmaine OF THE MEDS THAT DR NUNEZ GAVE HER TO TRY GET PREGNANT- NOW WHAT DOES SHE NEED TO DO ?  Please call her   [phone]

## 2021-05-25 PROCEDURE — U0004 COV-19 TEST NON-CDC HGH THRU: HCPCS | Performed by: FAMILY MEDICINE

## 2021-11-27 ENCOUNTER — APPOINTMENT (OUTPATIENT)
Dept: ULTRASOUND IMAGING | Facility: HOSPITAL | Age: 31
End: 2021-11-27

## 2021-11-27 ENCOUNTER — HOSPITAL ENCOUNTER (EMERGENCY)
Facility: HOSPITAL | Age: 31
Discharge: HOME | End: 2021-11-27
Attending: EMERGENCY MEDICINE | Admitting: EMERGENCY MEDICINE

## 2021-11-27 VITALS
HEIGHT: 68 IN | RESPIRATION RATE: 18 BRPM | BODY MASS INDEX: 44.41 KG/M2 | WEIGHT: 293 LBS | SYSTOLIC BLOOD PRESSURE: 154 MMHG | OXYGEN SATURATION: 94 % | DIASTOLIC BLOOD PRESSURE: 80 MMHG | HEART RATE: 84 BPM | TEMPERATURE: 97.7 F

## 2021-11-27 DIAGNOSIS — R10.31 ACUTE BILATERAL LOWER ABDOMINAL PAIN: Primary | ICD-10-CM

## 2021-11-27 DIAGNOSIS — R10.32 ACUTE BILATERAL LOWER ABDOMINAL PAIN: Primary | ICD-10-CM

## 2021-11-27 DIAGNOSIS — Z87.42 HISTORY OF PCOS: ICD-10-CM

## 2021-11-27 LAB
ALBUMIN SERPL-MCNC: 4 G/DL (ref 3.5–5.2)
ALBUMIN/GLOB SERPL: 1.3 G/DL
ALP SERPL-CCNC: 71 U/L (ref 39–117)
ALT SERPL W P-5'-P-CCNC: 9 U/L (ref 1–33)
ANION GAP SERPL CALCULATED.3IONS-SCNC: 8 MMOL/L (ref 5–15)
AST SERPL-CCNC: 16 U/L (ref 1–32)
B-HCG UR QL: NEGATIVE
BASOPHILS # BLD AUTO: 0.03 10*3/MM3 (ref 0–0.2)
BASOPHILS NFR BLD AUTO: 0.3 % (ref 0–1.5)
BILIRUB SERPL-MCNC: 0.2 MG/DL (ref 0–1.2)
BILIRUB UR QL STRIP: NEGATIVE
BUN SERPL-MCNC: 11 MG/DL (ref 6–20)
BUN/CREAT SERPL: 13.8 (ref 7–25)
CALCIUM SPEC-SCNC: 9.2 MG/DL (ref 8.6–10.5)
CHLORIDE SERPL-SCNC: 103 MMOL/L (ref 98–107)
CLARITY UR: CLEAR
CO2 SERPL-SCNC: 26 MMOL/L (ref 22–29)
COLOR UR: YELLOW
CREAT SERPL-MCNC: 0.8 MG/DL (ref 0.57–1)
DEPRECATED RDW RBC AUTO: 42 FL (ref 37–54)
EOSINOPHIL # BLD AUTO: 0.22 10*3/MM3 (ref 0–0.4)
EOSINOPHIL NFR BLD AUTO: 2.2 % (ref 0.3–6.2)
ERYTHROCYTE [DISTWIDTH] IN BLOOD BY AUTOMATED COUNT: 12.5 % (ref 12.3–15.4)
EXPIRATION DATE: NORMAL
GFR SERPL CREATININE-BSD FRML MDRD: 85 ML/MIN/1.73
GLOBULIN UR ELPH-MCNC: 3 GM/DL
GLUCOSE SERPL-MCNC: 149 MG/DL (ref 65–99)
GLUCOSE UR STRIP-MCNC: ABNORMAL MG/DL
HCT VFR BLD AUTO: 44.2 % (ref 34–46.6)
HGB BLD-MCNC: 14.6 G/DL (ref 12–15.9)
HGB UR QL STRIP.AUTO: NEGATIVE
HOLD SPECIMEN: NORMAL
IMM GRANULOCYTES # BLD AUTO: 0.02 10*3/MM3 (ref 0–0.05)
IMM GRANULOCYTES NFR BLD AUTO: 0.2 % (ref 0–0.5)
INTERNAL NEGATIVE CONTROL: NEGATIVE
INTERNAL POSITIVE CONTROL: POSITIVE
KETONES UR QL STRIP: NEGATIVE
LEUKOCYTE ESTERASE UR QL STRIP.AUTO: NEGATIVE
LIPASE SERPL-CCNC: 42 U/L (ref 13–60)
LYMPHOCYTES # BLD AUTO: 4.75 10*3/MM3 (ref 0.7–3.1)
LYMPHOCYTES NFR BLD AUTO: 47.6 % (ref 19.6–45.3)
Lab: NORMAL
MCH RBC QN AUTO: 30.2 PG (ref 26.6–33)
MCHC RBC AUTO-ENTMCNC: 33 G/DL (ref 31.5–35.7)
MCV RBC AUTO: 91.5 FL (ref 79–97)
MONOCYTES # BLD AUTO: 0.66 10*3/MM3 (ref 0.1–0.9)
MONOCYTES NFR BLD AUTO: 6.6 % (ref 5–12)
NEUTROPHILS NFR BLD AUTO: 4.3 10*3/MM3 (ref 1.7–7)
NEUTROPHILS NFR BLD AUTO: 43.1 % (ref 42.7–76)
NITRITE UR QL STRIP: NEGATIVE
NRBC BLD AUTO-RTO: 0 /100 WBC (ref 0–0.2)
PH UR STRIP.AUTO: 6 [PH] (ref 5–8)
PLAT MORPH BLD: NORMAL
PLATELET # BLD AUTO: 281 10*3/MM3 (ref 140–450)
PMV BLD AUTO: 10.1 FL (ref 6–12)
POTASSIUM SERPL-SCNC: 4.5 MMOL/L (ref 3.5–5.2)
PROT SERPL-MCNC: 7 G/DL (ref 6–8.5)
PROT UR QL STRIP: NEGATIVE
RBC # BLD AUTO: 4.83 10*6/MM3 (ref 3.77–5.28)
RBC MORPH BLD: NORMAL
SODIUM SERPL-SCNC: 137 MMOL/L (ref 136–145)
SP GR UR STRIP: 1.02 (ref 1–1.03)
UROBILINOGEN UR QL STRIP: ABNORMAL
WBC MORPH BLD: NORMAL
WBC NRBC COR # BLD: 9.98 10*3/MM3 (ref 3.4–10.8)
WHOLE BLOOD HOLD SPECIMEN: NORMAL
WHOLE BLOOD HOLD SPECIMEN: NORMAL

## 2021-11-27 PROCEDURE — 99283 EMERGENCY DEPT VISIT LOW MDM: CPT

## 2021-11-27 PROCEDURE — 83690 ASSAY OF LIPASE: CPT

## 2021-11-27 PROCEDURE — 80053 COMPREHEN METABOLIC PANEL: CPT

## 2021-11-27 PROCEDURE — 81003 URINALYSIS AUTO W/O SCOPE: CPT

## 2021-11-27 PROCEDURE — 93976 VASCULAR STUDY: CPT

## 2021-11-27 PROCEDURE — 85007 BL SMEAR W/DIFF WBC COUNT: CPT

## 2021-11-27 PROCEDURE — 76830 TRANSVAGINAL US NON-OB: CPT

## 2021-11-27 PROCEDURE — 81025 URINE PREGNANCY TEST: CPT

## 2021-11-27 PROCEDURE — 85025 COMPLETE CBC W/AUTO DIFF WBC: CPT

## 2021-11-27 PROCEDURE — 81025 URINE PREGNANCY TEST: CPT | Performed by: EMERGENCY MEDICINE

## 2021-11-27 RX ORDER — SODIUM CHLORIDE 9 MG/ML
10 INJECTION INTRAVENOUS AS NEEDED
Status: DISCONTINUED | OUTPATIENT
Start: 2021-11-27 | End: 2021-11-27 | Stop reason: HOSPADM

## 2021-11-27 RX ORDER — NAPROXEN 250 MG/1
250 TABLET ORAL 2 TIMES DAILY WITH MEALS
Qty: 10 TABLET | Refills: 0 | Status: SHIPPED | OUTPATIENT
Start: 2021-11-27 | End: 2021-12-02

## 2022-01-06 ENCOUNTER — HOSPITAL ENCOUNTER (EMERGENCY)
Facility: HOSPITAL | Age: 32
Discharge: HOME | End: 2022-01-06
Attending: EMERGENCY MEDICINE | Admitting: EMERGENCY MEDICINE

## 2022-01-06 VITALS
HEART RATE: 96 BPM | BODY MASS INDEX: 44.41 KG/M2 | DIASTOLIC BLOOD PRESSURE: 95 MMHG | HEIGHT: 68 IN | SYSTOLIC BLOOD PRESSURE: 148 MMHG | WEIGHT: 293 LBS | OXYGEN SATURATION: 98 % | RESPIRATION RATE: 18 BRPM | TEMPERATURE: 98.1 F

## 2022-01-06 DIAGNOSIS — H72.92 PERFORATION OF LEFT TYMPANIC MEMBRANE: ICD-10-CM

## 2022-01-06 DIAGNOSIS — H92.03 ACUTE EAR PAIN, BILATERAL: Primary | ICD-10-CM

## 2022-01-06 PROCEDURE — 99282 EMERGENCY DEPT VISIT SF MDM: CPT

## 2022-01-07 ENCOUNTER — HOSPITAL ENCOUNTER (EMERGENCY)
Facility: HOSPITAL | Age: 32
Discharge: HOME | End: 2022-01-07
Attending: EMERGENCY MEDICINE | Admitting: EMERGENCY MEDICINE

## 2022-01-07 VITALS
WEIGHT: 293 LBS | OXYGEN SATURATION: 97 % | HEART RATE: 95 BPM | TEMPERATURE: 100 F | SYSTOLIC BLOOD PRESSURE: 156 MMHG | DIASTOLIC BLOOD PRESSURE: 99 MMHG | BODY MASS INDEX: 44.41 KG/M2 | HEIGHT: 68 IN | RESPIRATION RATE: 17 BRPM

## 2022-01-07 DIAGNOSIS — H66.90 SUBACUTE OTITIS MEDIA, UNSPECIFIED OTITIS MEDIA TYPE: Primary | ICD-10-CM

## 2022-01-07 DIAGNOSIS — H92.02 OTALGIA OF LEFT EAR: ICD-10-CM

## 2022-01-07 DIAGNOSIS — H60.92 OTITIS EXTERNA OF LEFT EAR, UNSPECIFIED CHRONICITY, UNSPECIFIED TYPE: ICD-10-CM

## 2022-01-07 PROCEDURE — 25010000002 CEFTRIAXONE PER 250 MG: Performed by: EMERGENCY MEDICINE

## 2022-01-07 PROCEDURE — 96372 THER/PROPH/DIAG INJ SC/IM: CPT

## 2022-01-07 PROCEDURE — 99283 EMERGENCY DEPT VISIT LOW MDM: CPT

## 2022-01-07 RX ORDER — ACETAMINOPHEN AND CODEINE PHOSPHATE 300; 30 MG/1; MG/1
1 TABLET ORAL EVERY 6 HOURS PRN
Qty: 12 TABLET | Refills: 0 | Status: SHIPPED | OUTPATIENT
Start: 2022-01-07 | End: 2022-05-03

## 2022-01-07 RX ORDER — ACETAMINOPHEN AND CODEINE PHOSPHATE 300; 30 MG/1; MG/1
1 TABLET ORAL ONCE
Status: COMPLETED | OUTPATIENT
Start: 2022-01-07 | End: 2022-01-07

## 2022-01-07 RX ORDER — AMOXICILLIN 500 MG/1
1000 CAPSULE ORAL 2 TIMES DAILY
COMMUNITY
End: 2022-03-21

## 2022-01-07 RX ADMIN — LIDOCAINE HYDROCHLORIDE 1 G: 10 INJECTION, SOLUTION EPIDURAL; INFILTRATION; INTRACAUDAL; PERINEURAL at 18:05

## 2022-01-07 RX ADMIN — ACETAMINOPHEN AND CODEINE 1 TABLET: 300; 30 TABLET ORAL at 18:10

## 2022-01-07 NOTE — DISCHARGE INSTRUCTIONS
Continue current antibiotics.  Continue ibuprofen and Tylenol.  Add the Tylenol with codeine.  Do not exceed Tylenol 4000 mg in a 24-hour period.

## 2022-01-07 NOTE — ED PROVIDER NOTES
Subjective   Geeta Arteaga is a 31 yr old female that presents to the ER with c/o Lt ear pain.  Patient explains that for the past 4 to 5 days she developed pain in both of her ears however the left is greater than the right.  She was seen in our ER 2 days ago.  She was diagnosed with a ruptured left eardrum.  Patient has been taking eardrops and amoxicillin as ordered.  She has been alternating Tylenol and ibuprofen.  She advises her pain is continuing.  She is unable to sleep.  She complains of intermittent fevers and chills.  She attempted to see her PCP however the office was closed.  She advises that she attempted to contact the ear nose and throat specialist today and they were out of the office as well.      History provided by:  Patient   used: No    Earache  Location:  Left  Quality:  Throbbing and aching  Severity:  Moderate  Timing:  Constant  Progression:  Worsening  Associated symptoms: ear discharge, fever and headaches    Associated symptoms: no rhinorrhea and no vomiting        Review of Systems   Constitutional: Positive for chills and fever.   HENT: Positive for ear discharge and ear pain. Negative for rhinorrhea.    Gastrointestinal: Negative for vomiting.   Neurological: Positive for headaches.   All other systems reviewed and are negative.      Past Medical History:   Diagnosis Date   • Anxiety    • Depression    • PCOS (polycystic ovarian syndrome)        No Known Allergies    History reviewed. No pertinent surgical history.    History reviewed. No pertinent family history.    Social History     Socioeconomic History   • Marital status:    Tobacco Use   • Smoking status: Current Every Day Smoker     Packs/day: 0.50     Types: Cigarettes   • Smokeless tobacco: Never Used   Substance and Sexual Activity   • Alcohol use: Not Currently   • Drug use: No   • Sexual activity: Yes     Partners: Male     Birth control/protection: None           Objective   Physical Exam  Vitals  and nursing note reviewed. Exam conducted with a chaperone present.   Constitutional:       General: She is in acute distress.      Appearance: She is obese. She is ill-appearing.   HENT:      Head: Normocephalic and atraumatic.      Right Ear: Tympanic membrane, ear canal and external ear normal.      Left Ear: Drainage (pale yellow drainge), swelling and tenderness present.      Mouth/Throat:      Mouth: Mucous membranes are moist.      Pharynx: No pharyngeal swelling or posterior oropharyngeal erythema.   Eyes:      Extraocular Movements: Extraocular movements intact.   Cardiovascular:      Rate and Rhythm: Normal rate and regular rhythm.      Heart sounds: Normal heart sounds.   Pulmonary:      Effort: Pulmonary effort is normal.   Musculoskeletal:         General: Normal range of motion.   Skin:     General: Skin is warm.   Neurological:      Mental Status: She is alert and oriented to person, place, and time.   Psychiatric:         Behavior: Behavior normal.         Procedures           ED Course  ED Course as of 01/07/22 1730   Fri Jan 07, 2022   1704 Temp: 100 °F (37.8 °C) [RS]   1710 Patient to continue her current occasions.  Patient to see her PCP and ear nose and throat as planned.  Patient to add the Tylenol with codeine as ordered.  Patient agrees and verbalized understanding. [KG]      ED Course User Index  [KG] Brittani Ashley, APRN  [RS] Prabhjot Carrizales MD                                   [facility] reviewed by Prabhjot Carrizales MD             [facility]    Final diagnoses:   Subacute otitis media, unspecified otitis media type   Otitis externa of left ear, unspecified chronicity, unspecified type   Otalgia of left ear       ED Disposition  ED Disposition     ED Disposition Condition Comment    Discharge Stable           Tato Wade MD  [address]  [facility] [address]  [phone]          [facility] Emergency Department  [address]  [place] [id number] [phone]    As needed, If symptoms worsen or ANY concerns.         Medication List      New Prescriptions    acetaminophen-codeine 300-30 MG per tablet  Commonly known as: TYLENOL #3  Take 1 tablet by mouth Every 6 (Six) Hours As Needed for Moderate Pain .           Where to Get Your Medications      These medications were sent to [facility]/pharmacy #[id number] - [place], [place] - [address] - [phone]  - [phone]   [address] [place] [address]    Hours: 24-hours Phone: [phone]   · acetaminophen-codeine 300-30 MG per tablet          Brittani Ashley, APRN  01/07/22 5754

## 2022-01-08 ENCOUNTER — HOSPITAL ENCOUNTER (EMERGENCY)
Facility: HOSPITAL | Age: 32
Discharge: HOME | End: 2022-01-08
Attending: EMERGENCY MEDICINE | Admitting: EMERGENCY MEDICINE

## 2022-01-08 ENCOUNTER — APPOINTMENT (OUTPATIENT)
Dept: CT IMAGING | Facility: HOSPITAL | Age: 32
End: 2022-01-08

## 2022-01-08 VITALS
RESPIRATION RATE: 16 BRPM | TEMPERATURE: 97.1 F | DIASTOLIC BLOOD PRESSURE: 108 MMHG | HEART RATE: 110 BPM | WEIGHT: 293 LBS | OXYGEN SATURATION: 96 % | HEIGHT: 68 IN | SYSTOLIC BLOOD PRESSURE: 176 MMHG | BODY MASS INDEX: 44.41 KG/M2

## 2022-01-08 DIAGNOSIS — K59.00 CONSTIPATION, UNSPECIFIED CONSTIPATION TYPE: ICD-10-CM

## 2022-01-08 DIAGNOSIS — R10.84 GENERALIZED ABDOMINAL PAIN: Primary | ICD-10-CM

## 2022-01-08 LAB
ALBUMIN SERPL-MCNC: 4.3 G/DL (ref 3.5–5.2)
ALBUMIN/GLOB SERPL: 1.3 G/DL
ALP SERPL-CCNC: 72 U/L (ref 39–117)
ALT SERPL W P-5'-P-CCNC: 8 U/L (ref 1–33)
ANION GAP SERPL CALCULATED.3IONS-SCNC: 9 MMOL/L (ref 5–15)
AST SERPL-CCNC: 15 U/L (ref 1–32)
B-HCG UR QL: NEGATIVE
BASOPHILS # BLD AUTO: 0.04 10*3/MM3 (ref 0–0.2)
BASOPHILS NFR BLD AUTO: 0.3 % (ref 0–1.5)
BILIRUB SERPL-MCNC: 0.3 MG/DL (ref 0–1.2)
BILIRUB UR QL STRIP: NEGATIVE
BUN SERPL-MCNC: 7 MG/DL (ref 6–20)
BUN/CREAT SERPL: 8.6 (ref 7–25)
CALCIUM SPEC-SCNC: 9.4 MG/DL (ref 8.6–10.5)
CHLORIDE SERPL-SCNC: 100 MMOL/L (ref 98–107)
CLARITY UR: CLEAR
CO2 SERPL-SCNC: 27 MMOL/L (ref 22–29)
COLOR UR: YELLOW
CREAT SERPL-MCNC: 0.81 MG/DL (ref 0.57–1)
DEPRECATED RDW RBC AUTO: 43 FL (ref 37–54)
EOSINOPHIL # BLD AUTO: 0.13 10*3/MM3 (ref 0–0.4)
EOSINOPHIL NFR BLD AUTO: 1.1 % (ref 0.3–6.2)
ERYTHROCYTE [DISTWIDTH] IN BLOOD BY AUTOMATED COUNT: 12.6 % (ref 12.3–15.4)
GFR SERPL CREATININE-BSD FRML MDRD: 84 ML/MIN/1.73
GLOBULIN UR ELPH-MCNC: 3.4 GM/DL
GLUCOSE SERPL-MCNC: 118 MG/DL (ref 65–99)
GLUCOSE UR STRIP-MCNC: NEGATIVE MG/DL
HCT VFR BLD AUTO: 47 % (ref 34–46.6)
HGB BLD-MCNC: 15.4 G/DL (ref 12–15.9)
HGB UR QL STRIP.AUTO: NEGATIVE
HOLD SPECIMEN: NORMAL
IMM GRANULOCYTES # BLD AUTO: 0.05 10*3/MM3 (ref 0–0.05)
IMM GRANULOCYTES NFR BLD AUTO: 0.4 % (ref 0–0.5)
KETONES UR QL STRIP: NEGATIVE
LEUKOCYTE ESTERASE UR QL STRIP.AUTO: NEGATIVE
LIPASE SERPL-CCNC: 21 U/L (ref 13–60)
LYMPHOCYTES # BLD AUTO: 2.89 10*3/MM3 (ref 0.7–3.1)
LYMPHOCYTES NFR BLD AUTO: 24.9 % (ref 19.6–45.3)
MCH RBC QN AUTO: 30.1 PG (ref 26.6–33)
MCHC RBC AUTO-ENTMCNC: 32.8 G/DL (ref 31.5–35.7)
MCV RBC AUTO: 91.8 FL (ref 79–97)
MONOCYTES # BLD AUTO: 0.89 10*3/MM3 (ref 0.1–0.9)
MONOCYTES NFR BLD AUTO: 7.7 % (ref 5–12)
NEUTROPHILS NFR BLD AUTO: 65.6 % (ref 42.7–76)
NEUTROPHILS NFR BLD AUTO: 7.62 10*3/MM3 (ref 1.7–7)
NITRITE UR QL STRIP: NEGATIVE
NRBC BLD AUTO-RTO: 0 /100 WBC (ref 0–0.2)
PH UR STRIP.AUTO: 6 [PH] (ref 5–8)
PLATELET # BLD AUTO: 280 10*3/MM3 (ref 140–450)
PMV BLD AUTO: 10.1 FL (ref 6–12)
POTASSIUM SERPL-SCNC: 4.6 MMOL/L (ref 3.5–5.2)
PROT SERPL-MCNC: 7.7 G/DL (ref 6–8.5)
PROT UR QL STRIP: NEGATIVE
RBC # BLD AUTO: 5.12 10*6/MM3 (ref 3.77–5.28)
SODIUM SERPL-SCNC: 136 MMOL/L (ref 136–145)
SP GR UR STRIP: 1.01 (ref 1–1.03)
UROBILINOGEN UR QL STRIP: NORMAL
WBC NRBC COR # BLD: 11.62 10*3/MM3 (ref 3.4–10.8)
WHOLE BLOOD HOLD SPECIMEN: NORMAL
WHOLE BLOOD HOLD SPECIMEN: NORMAL

## 2022-01-08 PROCEDURE — 74176 CT ABD & PELVIS W/O CONTRAST: CPT

## 2022-01-08 PROCEDURE — 85025 COMPLETE CBC W/AUTO DIFF WBC: CPT | Performed by: EMERGENCY MEDICINE

## 2022-01-08 PROCEDURE — 99283 EMERGENCY DEPT VISIT LOW MDM: CPT

## 2022-01-08 PROCEDURE — 36415 COLL VENOUS BLD VENIPUNCTURE: CPT

## 2022-01-08 PROCEDURE — 81003 URINALYSIS AUTO W/O SCOPE: CPT | Performed by: EMERGENCY MEDICINE

## 2022-01-08 PROCEDURE — 96372 THER/PROPH/DIAG INJ SC/IM: CPT

## 2022-01-08 PROCEDURE — 63710000001 ONDANSETRON ODT 4 MG TABLET DISPERSIBLE: Performed by: NURSE PRACTITIONER

## 2022-01-08 PROCEDURE — 83690 ASSAY OF LIPASE: CPT | Performed by: EMERGENCY MEDICINE

## 2022-01-08 PROCEDURE — 81025 URINE PREGNANCY TEST: CPT | Performed by: EMERGENCY MEDICINE

## 2022-01-08 PROCEDURE — 25010000002 DICYCLOMINE PER 20 MG: Performed by: NURSE PRACTITIONER

## 2022-01-08 PROCEDURE — 80053 COMPREHEN METABOLIC PANEL: CPT | Performed by: EMERGENCY MEDICINE

## 2022-01-08 RX ORDER — DICYCLOMINE HYDROCHLORIDE 10 MG/ML
20 INJECTION INTRAMUSCULAR ONCE
Status: COMPLETED | OUTPATIENT
Start: 2022-01-08 | End: 2022-01-08

## 2022-01-08 RX ORDER — DICYCLOMINE HCL 20 MG
20 TABLET ORAL EVERY 6 HOURS
Qty: 24 TABLET | Refills: 0 | Status: SHIPPED | OUTPATIENT
Start: 2022-01-08 | End: 2022-05-03

## 2022-01-08 RX ORDER — POLYETHYLENE GLYCOL 3350 17 G/17G
17 POWDER, FOR SOLUTION ORAL DAILY
Qty: 225 G | Refills: 0 | Status: SHIPPED | OUTPATIENT
Start: 2022-01-08 | End: 2022-05-03

## 2022-01-08 RX ORDER — SODIUM CHLORIDE 9 MG/ML
10 INJECTION INTRAVENOUS AS NEEDED
Status: DISCONTINUED | OUTPATIENT
Start: 2022-01-08 | End: 2022-01-08 | Stop reason: HOSPADM

## 2022-01-08 RX ORDER — ONDANSETRON 4 MG/1
4 TABLET, ORALLY DISINTEGRATING ORAL ONCE
Status: COMPLETED | OUTPATIENT
Start: 2022-01-08 | End: 2022-01-08

## 2022-01-08 RX ORDER — SODIUM CHLORIDE 0.9 % (FLUSH) 0.9 %
10 SYRINGE (ML) INJECTION AS NEEDED
Status: DISCONTINUED | OUTPATIENT
Start: 2022-01-08 | End: 2022-01-08 | Stop reason: HOSPADM

## 2022-01-08 RX ADMIN — ONDANSETRON 4 MG: 4 TABLET, ORALLY DISINTEGRATING ORAL at 16:34

## 2022-01-08 RX ADMIN — DICYCLOMINE HYDROCHLORIDE 20 MG: 20 INJECTION INTRAMUSCULAR at 16:34

## 2022-01-09 NOTE — ED PROVIDER NOTES
Subjective   Geeta Arteaga is a 31 yr old female that presents the emergency department complaints of abdominal pain.  Patient reports diffuse abdominal pain it has been off and on since 11 AM.  She complains of nausea, vomiting.  Patient's last bowel movement was 5 days ago.  She reports a decrease in urination as well.  She advises she is only able to trickle.  Patient was seen in our ER last night and diagnosed with otitis.  Patient reports no abdominal surgical history.  Her pain is currently an 8 out of 10.      History provided by:  Patient   used: No    Abdominal Pain  Pain location:  Generalized  Pain severity:  Moderate  Timing:  Intermittent  Relieved by:  Nothing  Worsened by:  Nothing  Associated symptoms: nausea and vomiting    Associated symptoms: no chest pain, no chills, no cough, no dysuria, no fever and no shortness of breath        Review of Systems   Constitutional: Negative for chills and fever.   Respiratory: Negative for cough and shortness of breath.    Cardiovascular: Negative for chest pain.   Gastrointestinal: Positive for abdominal pain, nausea and vomiting.   Genitourinary: Negative for dysuria.   Neurological: Negative for dizziness and weakness.   All other systems reviewed and are negative.      Past Medical History:   Diagnosis Date   • Anxiety    • Depression    • PCOS (polycystic ovarian syndrome)        No Known Allergies    History reviewed. No pertinent surgical history.    History reviewed. No pertinent family history.    Social History     Socioeconomic History   • Marital status:    Tobacco Use   • Smoking status: Current Every Day Smoker     Packs/day: 0.50     Types: Cigarettes   • Smokeless tobacco: Never Used   Substance and Sexual Activity   • Alcohol use: Not Currently   • Drug use: No   • Sexual activity: Yes     Partners: Male     Birth control/protection: None           Objective   Physical Exam  Constitutional:       General: She is in  acute distress.      Appearance: Normal appearance. She is well-developed. She is obese. She is not ill-appearing or toxic-appearing.   HENT:      Head: Normocephalic and atraumatic.      Mouth/Throat:      Mouth: Mucous membranes are moist.   Eyes:      General: Lids are normal.      Extraocular Movements: Extraocular movements intact.      Conjunctiva/sclera: Conjunctivae normal.      Pupils: Pupils are equal, round, and reactive to light.   Neck:      Trachea: Trachea normal.   Cardiovascular:      Rate and Rhythm: Regular rhythm. Tachycardia present.      Pulses: Normal pulses.      Heart sounds: Normal heart sounds.   Pulmonary:      Effort: Pulmonary effort is normal. No respiratory distress.      Breath sounds: Normal breath sounds. No decreased breath sounds, wheezing, rhonchi or rales.   Abdominal:      General: Bowel sounds are normal.      Palpations: Abdomen is soft.      Tenderness: There is abdominal tenderness in the epigastric area and left upper quadrant.   Musculoskeletal:         General: Normal range of motion.      Cervical back: Full passive range of motion without pain and normal range of motion.   Skin:     General: Skin is warm and dry.      Findings: No rash.   Neurological:      Mental Status: She is alert and oriented to person, place, and time.      Cranial Nerves: No cranial nerve deficit.   Psychiatric:         Speech: Speech normal.         Behavior: Behavior normal. Behavior is cooperative.         Procedures           ED Course  ED Course as of 01/08/22 2058   Sat Jan 08, 2022   1527 WBC(!): 11.62 [KG]   1658 HCG, Urine QL: Negative [KG]   1658 WBC(!): 11.62 [KG]   1818 Results are discussed with patient at this time.  Patient will be discharged home.  Patient to use meds as ordered and follow-up with GI. [KG]      ED Course User Index  [KG] Brittani Ashley APRN           Recent Results (from the past 24 hour(s))   Comprehensive Metabolic Panel    Collection Time: 01/08/22  2:33  PM    Specimen: Blood   Result Value Ref Range    Glucose 118 (H) 65 - 99 mg/dL    BUN 7 6 - 20 mg/dL    Creatinine 0.81 0.57 - 1.00 mg/dL    Sodium 136 136 - 145 mmol/L    Potassium 4.6 3.5 - 5.2 mmol/L    Chloride 100 98 - 107 mmol/L    CO2 27.0 22.0 - 29.0 mmol/L    Calcium 9.4 8.6 - 10.5 mg/dL    Total Protein 7.7 6.0 - 8.5 g/dL    Albumin 4.30 3.50 - 5.20 g/dL    ALT (SGPT) 8 1 - 33 U/L    AST (SGOT) 15 1 - 32 U/L    Alkaline Phosphatase 72 39 - 117 U/L    Total Bilirubin 0.3 0.0 - 1.2 mg/dL    eGFR Non African Amer 84 >60 mL/min/1.73    Globulin 3.4 gm/dL    A/G Ratio 1.3 g/dL    BUN/Creatinine Ratio 8.6 7.0 - 25.0    Anion Gap 9.0 5.0 - 15.0 mmol/L   Lipase    Collection Time: 01/08/22  2:33 PM    Specimen: Blood   Result Value Ref Range    Lipase 21 13 - 60 U/L   Green Top (Gel)    Collection Time: 01/08/22  2:33 PM   Result Value Ref Range    Extra Tube Hold for add-ons.    Lavender Top    Collection Time: 01/08/22  2:33 PM   Result Value Ref Range    Extra Tube hold for add-on    Gold Top - SST    Collection Time: 01/08/22  2:33 PM   Result Value Ref Range    Extra Tube Hold for add-ons.    Gray Top    Collection Time: 01/08/22  2:33 PM   Result Value Ref Range    Extra Tube Hold for add-ons.    Light Blue Top    Collection Time: 01/08/22  2:33 PM   Result Value Ref Range    Extra Tube hold for add-on    CBC Auto Differential    Collection Time: 01/08/22  2:33 PM    Specimen: Blood   Result Value Ref Range    WBC 11.62 (H) 3.40 - 10.80 10*3/mm3    RBC 5.12 3.77 - 5.28 10*6/mm3    Hemoglobin 15.4 12.0 - 15.9 g/dL    Hematocrit 47.0 (H) 34.0 - 46.6 %    MCV 91.8 79.0 - 97.0 fL    MCH 30.1 26.6 - 33.0 pg    MCHC 32.8 31.5 - 35.7 g/dL    RDW 12.6 12.3 - 15.4 %    RDW-SD 43.0 37.0 - 54.0 fl    MPV 10.1 6.0 - 12.0 fL    Platelets 280 140 - 450 10*3/mm3    Neutrophil % 65.6 42.7 - 76.0 %    Lymphocyte % 24.9 19.6 - 45.3 %    Monocyte % 7.7 5.0 - 12.0 %    Eosinophil % 1.1 0.3 - 6.2 %    Basophil % 0.3 0.0 - 1.5 %  "   Immature Grans % 0.4 0.0 - 0.5 %    Neutrophils, Absolute 7.62 (H) 1.70 - 7.00 10*3/mm3    Lymphocytes, Absolute 2.89 0.70 - 3.10 10*3/mm3    Monocytes, Absolute 0.89 0.10 - 0.90 10*3/mm3    Eosinophils, Absolute 0.13 0.00 - 0.40 10*3/mm3    Basophils, Absolute 0.04 0.00 - 0.20 10*3/mm3    Immature Grans, Absolute 0.05 0.00 - 0.05 10*3/mm3    nRBC 0.0 0.0 - 0.2 /100 WBC   Urinalysis With Microscopic If Indicated (No Culture) - Urine, Clean Catch    Collection Time: 01/08/22  3:46 PM    Specimen: Urine, Clean Catch   Result Value Ref Range    Color, UA Yellow Yellow, Straw    Appearance, UA Clear Clear    pH, UA 6.0 5.0 - 8.0    Specific Gravity, UA 1.007 1.001 - 1.030    Glucose, UA Negative Negative    Ketones, UA Negative Negative    Bilirubin, UA Negative Negative    Blood, UA Negative Negative    Protein, UA Negative Negative    Leuk Esterase, UA Negative Negative    Nitrite, UA Negative Negative    Urobilinogen, UA 0.2 E.U./dL 0.2 - 1.0 E.U./dL   Pregnancy, Urine - Urine, Clean Catch    Collection Time: 01/08/22  3:46 PM    Specimen: Urine, Clean Catch   Result Value Ref Range    HCG, Urine QL Negative Negative     Note: In addition to lab results from this visit, the labs listed above may include labs taken at another facility or during a different encounter within the last 24 hours. Please correlate lab times with ED admission and discharge times for further clarification of the services performed during this visit.    CT Abdomen Pelvis Without Contrast   Preliminary Result   No acute intra-abdominal or pelvic abnormality.       DICTATED:   01/08/2022   EDITED/lfs:   01/08/2022                Vitals:    01/08/22 1416 01/08/22 1515 01/08/22 1528   BP: (!) 176/108     BP Location: Right arm     Patient Position: Sitting     Pulse: 110     Resp: 16     Temp: 97.1 °F (36.2 °C)     TempSrc: Temporal     SpO2: 97% 96% 96%   Weight: (!) 159 kg (350 lb)     Height: 172.7 cm (68\")       Medications   dicyclomine " (BENTYL) injection 20 mg (20 mg Intramuscular Given 1/8/22 1634)   ondansetron ODT (ZOFRAN-ODT) disintegrating tablet 4 mg (4 mg Oral Given 1/8/22 1634)     ECG/EMG Results (last 24 hours)     ** No results found for the last 24 hours. **        No orders to display                                             MDM    Final diagnoses:   Generalized abdominal pain   Constipation, unspecified constipation type       ED Disposition  ED Disposition     ED Disposition Condition Comment    Discharge Stable           Lyle Michael MD  [address] [facility]  [place] [address]  [phone]               Medication List      New Prescriptions    dicyclomine 20 MG tablet  Commonly known as: BENTYL  Take 1 tablet by mouth Every 6 (Six) Hours.     polyethylene glycol 17 GM/SCOOP powder  Commonly known as: MIRALAX  Take 17 g by mouth Daily.           Where to Get Your Medications      These medications were sent to [facility]/pharmacy #[id number] - [place], [place] - [address] - [phone]  - [phone]  [address] [place] [address]    Hours: 24-hours Phone: [phone]   · dicyclomine 20 MG tablet  · polyethylene glycol 17 GM/SCOOP powder          Brittani Ashley, APRN  01/08/22 9474

## 2022-01-11 NOTE — ED PROVIDER NOTES
[place]    EMERGENCY DEPARTMENT ENCOUNTER      Pt Name: Geeta Arteaga  MRN: [id number]  YOB: 1990  Date of evaluation: 1/6/2022  Provider: MICHELLE Nix    CHIEF COMPLAINT       Chief Complaint   Patient presents with   • Earache         HISTORY OF PRESENT ILLNESS  (Location/Symptom, Timing/Onset, Context/Setting, Quality, Duration, Modifying Factors, Severity.)   Geeta Arteaga is a 31 y.o. female who presents to the emergency department with complaints of bilateral ear pan for the last several days. Patient shares that the pain in her left ear today is worse although they both are providing her discomfort. She denies anything specific that makes her pain better or worse and shares that she has been trying to manage her pain with tylenol and/or ibuprofen but it is not helping. She reports no additional associated symptoms on exam. Patient was seen outpatient and prescribed amoxicillin and has been compliant with her medication.       Earache  Location:  Bilateral  Behind ear:  No abnormality  Quality:  Aching  Severity:  Moderate  Onset quality:  Gradual  Duration:  3 days  Timing:  Constant  Progression:  Worsening  Chronicity:  New  Context: recent URI    Relieved by:  Nothing  Worsened by:  Nothing  Ineffective treatments:  OTC medications  Associated symptoms: ear discharge and rhinorrhea       Nursing notes were reviewed.    REVIEW OF SYSTEMS    (2-9 systems for level 4, 10 or more for level 5)   Review of Systems   Constitutional: Negative.    HENT: Positive for ear discharge, ear pain and rhinorrhea.    Respiratory: Negative.    Gastrointestinal: Negative.    Musculoskeletal: Negative.    Skin: Negative.    Neurological: Negative.    All other systems reviewed and are negative.       All systems reviewed and negative except for those discussed in HPI.   PAST MEDICAL HISTORY     Past Medical History:   Diagnosis Date   • Anxiety    • Depression    • PCOS (polycystic ovarian syndrome)           SURGICAL HISTORY     No past surgical history on file.      CURRENT MEDICATIONS     No current facility-administered medications for this encounter.    Current Outpatient Medications:   •  acetaminophen-codeine (TYLENOL #3) 300-30 MG per tablet, Take 1 tablet by mouth Every 6 (Six) Hours As Needed for Moderate Pain ., Disp: 12 tablet, Rfl: 0  •  amoxicillin (AMOXIL) 500 MG capsule, Take 1,000 mg by mouth 2 (Two) Times a Day., Disp: , Rfl:   •  clomiPHENE (CLOMID) 50 MG tablet, Take 1 tablet by mouth Daily. Cycle days 3 through 7, Disp: 5 tablet, Rfl: 3  •  dicyclomine (BENTYL) 20 MG tablet, Take 1 tablet by mouth Every 6 (Six) Hours., Disp: 24 tablet, Rfl: 0  •  Melatonin 1 MG chewable tablet, Chew 4 mg Every Night., Disp: , Rfl:   •  norethindrone (Aygestin) 5 MG tablet, Take 1 tablet by mouth Daily., Disp: 12 tablet, Rfl: 5  •  polyethylene glycol (MIRALAX) 17 GM/SCOOP powder, Take 17 g by mouth Daily., Disp: 225 g, Rfl: 0  •  Prenatal Vit-Fe Fumarate-FA (Prenatal Vitamin) 27-0.8 MG tablet, Take 1 tablet by mouth Daily., Disp: 30 tablet, Rfl: 11    ALLERGIES     Patient has no known allergies.    FAMILY HISTORY     No family history on file.       SOCIAL HISTORY       Social History     Socioeconomic History   • Marital status:    Tobacco Use   • Smoking status: Current Every Day Smoker     Packs/day: 0.50     Types: Cigarettes   • Smokeless tobacco: Never Used   Substance and Sexual Activity   • Alcohol use: Not Currently   • Drug use: No   • Sexual activity: Yes     Partners: Male     Birth control/protection: None         PHYSICAL EXAM    (up to 7 for level 4, 8 or more for level 5)   Physical Exam  Vitals and nursing note reviewed.   Constitutional:       General: She is not in acute distress.     Appearance: She is well-developed. She is not ill-appearing or toxic-appearing.   HENT:      Head: Normocephalic and atraumatic.      Right Ear: External ear normal. Tympanic membrane is injected and  erythematous.      Left Ear: External ear normal. Drainage and tenderness present. Tympanic membrane is perforated.      Nose: Congestion and rhinorrhea present.      Mouth/Throat:      Mouth: Mucous membranes are moist.      Pharynx: No posterior oropharyngeal erythema.   Eyes:      Extraocular Movements: Extraocular movements intact.      Conjunctiva/sclera: Conjunctivae normal.   Cardiovascular:      Rate and Rhythm: Normal rate.   Pulmonary:      Effort: Pulmonary effort is normal. No respiratory distress.      Breath sounds: Normal breath sounds.   Abdominal:      General: There is no distension.   Musculoskeletal:         General: Normal range of motion.      Cervical back: Normal range of motion and neck supple.   Skin:     General: Skin is warm and dry.   Neurological:      General: No focal deficit present.      Mental Status: She is alert.   Psychiatric:         Behavior: Behavior normal.         Thought Content: Thought content normal.         Judgment: Judgment normal.          DIAGNOSTIC RESULTS     EKG: All EKGs are interpreted by the Emergency Department Physician who either signs or Co-signs this chart in the absence of a cardiologist.    No orders to display       RADIOLOGY:   Non-plain film images such as CT, Ultrasound and MRI are read by the radiologist. Plain radiographic images are visualized and preliminarily interpreted by the emergency physician with the below findings:      [] Radiologist's Report Reviewed:  No orders to display         ED BEDSIDE ULTRASOUND:   Performed by ED Physician - none    LABS:    I have reviewed and interpreted all of the currently available lab results from this visit (if applicable):       All other labs were within normal range or not returned as of this dictation.      EMERGENCY DEPARTMENT COURSE and DIFFERENTIAL DIAGNOSIS/MDM:   Vitals:    Vitals:    01/06/22 0033   BP: 148/95   BP Location: Left arm   Patient Position: Sitting   Pulse: 96   Resp: 18   Temp:  "98.1 °F (36.7 °C)   TempSrc: Oral   SpO2: 98%   Weight: (!) 159 kg (350 lb)   Height: 172.7 cm (68\")       ED Course as of 01/11/22 1749 Tue Jan 11, 2022 1746 Patient presents to ED with bilateral ear pain L>R. Diagnosed with otitis media and prescribed amoxicillin outpatient. Evidence of ruptured TM on left. No additional acute or emergent findings on physical exam. Patient is afebrile, nontoxic appearing, vital signs stable and able to maintain O2 sats of 98% on room air. Patient will be discharged home with symptomatic care and instructions to complete antibiotic therapy as prescribed and outpatient follow up. [JG]      ED Course User Index  [JG] Jacobo Ross, PA         MDM  Number of Diagnoses or Management Options  Acute ear pain, bilateral: new, needed workup  Perforation of left tympanic membrane: new, needed workup  Risk of Complications, Morbidity, and/or Mortality  Presenting problems: low  Diagnostic procedures: low  Management options: low    Patient Progress  Patient progress: stable       I had a discussion with the patient/family regarding diagnosis, diagnostic results, treatment plan, and medications.  The patient/family indicated understanding of these instructions.  I spent adequate time at the bedside preceding discharge necessary to personally discuss the aftercare instructions, giving patient education, providing explanations of the results of our evaluations/findings, and my decision making to assure that the patient/family understand the plan of care.  Time was allotted to answer questions at that time and throughout the ED course.  Emphasis was placed on timely follow-up after discharge.  I also discussed the potential for the development of an acute emergent condition requiring further evaluation, admission, or even surgical intervention. I discussed that we found nothing during the visit today indicating the need for further workup, admission, or the presence of an unstable medical " condition.  I encouraged the patient to return to the emergency department immediately for ANY concerns, worsening, new complaints, or if symptoms persist and unable to seek follow-up in a timely fashion.  The patient/family expressed understanding and agreement with this plan.  The patient will follow-up with PCP and ENT for reevaluation.       MEDICATIONS ADMINISTERED IN ED:  Medications - No data to display    PROCEDURES:  Procedures          CRITICAL CARE TIME    Total Critical Care time was 0 minutes, excluding separately reportable procedures.   There was a high probability of clinically significant/life threatening deterioration in the patient's condition which required my urgent intervention.      FINAL IMPRESSION      1. Acute ear pain, bilateral    2. Perforation of left tympanic membrane          DISPOSITION/PLAN     ED Disposition     ED Disposition Condition Comment    Discharge Stable           PATIENT REFERRED TO:  [facility] - [place] [address]  [phone]  Schedule an appointment as soon as possible for a visit   Call to establish with primary care    Tato Wade MD  [address] [facility]  [place] [address]  [phone]      Call for follow-up with ENT    [facility] Emergency Department  [address] [place] [address] [phone]  Go to   If symptoms worsen      DISCHARGE MEDICATIONS:     Medication List      CONTINUE taking these medications    clomiPHENE 50 MG tablet  Commonly known as: CLOMID  Take 1 tablet by mouth Daily. Cycle days 3 through 7     Melatonin 1 MG chewable tablet     norethindrone 5 MG tablet  Commonly known as: Aygestin  Take 1 tablet by mouth Daily.     Prenatal Vitamin 27-0.8 MG tablet  Take 1 tablet by mouth Daily.                Comment: Please note this report has been produced using speech recognition software.      MICHELLE Nix Jason C, PA  01/11/22 2100

## 2022-03-21 PROCEDURE — U0004 COV-19 TEST NON-CDC HGH THRU: HCPCS | Performed by: FAMILY MEDICINE

## 2022-04-27 NOTE — DISCHARGE INSTRUCTIONS
Subjective:      Patient ID: Rosy Lilly is a 46 y.o. female. 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 Denies joint pain. Denies anxious or depressed mood. Immunizations UTD per patient     Immunization History   Administered Date(s) Administered    Influenza Vaccine, unspecified formulation 11/21/2016    Influenza Virus Vaccine 10/15/2013    Tdap (Boostrix, Adacel) 01/30/2020       BP elevated again. Stress with dealing with dads estate. Denies medication. Cut out sugar in diet. BP Readings from Last 3 Encounters:   04/27/22 (!) 146/90   03/22/22 (!) 142/94   08/25/21 138/84       Lab pending from orders placed 1 month ago.      No results found for: LABA1C  No results found for: EAG    No components found for: CHLPL  No results found for: TRIG  No results found for: HDL  No results found for: LDLCALC  No results found for: LABVLDL      Chemistry        Component Value Date/Time     11/27/2016 1253    K 3.9 11/27/2016 1253     11/27/2016 1253    CO2 20 (L) 11/27/2016 1253    BUN 5 (L) 11/27/2016 1253    CREATININE 0.4 11/27/2016 1253        Component Value Date/Time    CALCIUM 9.2 11/27/2016 1253    ALKPHOS 70 11/27/2016 1253    AST 15 11/27/2016 1253    ALT 19 11/27/2016 1253    BILITOT 0.3 11/27/2016 1253            Lab Results   Component Value Date    TSH 0.016 (L) 12/21/2016    I5FKRKH 99 05/08/2016       Lab Results   Component Value Date    WBC 12.1 (H) 06/23/2017    HGB 9.8 (L) 06/23/2017    HCT 29.2 (L) 06/23/2017    MCV 93.6 06/23/2017     06/23/2017         Health Maintenance   Topic Date Due    Varicella vaccine (1 of 2 - 2-dose childhood series) Never done    COVID-19 Vaccine (1) Never done   [employer] Symptomatic care is recommended with Tylenol or ibuprofen as needed for pain.  Continue your previous course of antibiotics as prescribed.  Take all medications as prescribed and instructed. Follow up with primary care and ENT as directed or return to Emergency Department with worsening of symptoms.   Pneumococcal 0-64 years Vaccine (1 - PCV) Never done    Hepatitis C screen  Never done    Diabetes screen  Never done    Depression Screen  07/22/2022    Flu vaccine (Season Ended) 09/01/2022    Cervical cancer screen  07/21/2024    DTaP/Tdap/Td vaccine (2 - Td or Tdap) 01/30/2030    HIV screen  Completed    Hepatitis A vaccine  Aged Out    Hepatitis B vaccine  Aged Out    Hib vaccine  Aged Out    Meningococcal (ACWY) vaccine  Aged [employer] History   Administered Date(s) Administered    Influenza Vaccine, unspecified formulation 11/21/2016    Influenza Virus Vaccine 10/15/2013    Tdap (Boostrix, Adacel) 01/30/2020       Review of Systems   Constitutional: Negative for chills and fever. HENT: Negative. Respiratory: Negative for cough and shortness of breath. Cardiovascular: Negative for chest pain. Gastrointestinal: Negative for abdominal pain and nausea. Skin: Negative for rash. Neurological: Negative for dizziness, light-headedness and headaches. Psychiatric/Behavioral: Negative. Objective:   Physical Exam  Constitutional:       General: She is not in acute distress. Eyes:      Pupils: Pupils are equal, round, and reactive to light. Cardiovascular:      Rate and Rhythm: Normal rate and regular rhythm. Heart sounds: No murmur heard. Pulmonary:      Effort: Pulmonary effort is normal.      Breath sounds: Normal breath sounds. No wheezing. Abdominal:      General: Bowel sounds are normal. There is no distension. Palpations: Abdomen is soft. Tenderness: There is no abdominal tenderness. Musculoskeletal:         General: No tenderness. Normal range of motion. Cervical back: Normal range of motion and neck supple. Skin:     General: Skin is warm and dry. Findings: No rash. Assessment:       Diagnosis Orders   1. Pre-employment examination     2.  Elevated BP without diagnosis of hypertension             Plan: Physically cleared for employment   Immunizations UTD  Healthy Lifestyles discussed  Denied medications for BP - diet, exercise weight loss stressed  COVID Vaccine Waiver completed  Complete Labs from 1 month ago  RTO if symptoms worsen or stay the same          TORREY Park - CNP

## 2022-05-03 ENCOUNTER — OFFICE VISIT (OUTPATIENT)
Dept: INTERNAL MEDICINE | Facility: CLINIC | Age: 32
End: 2022-05-03

## 2022-05-03 VITALS
BODY MASS INDEX: 47.09 KG/M2 | DIASTOLIC BLOOD PRESSURE: 88 MMHG | HEART RATE: 79 BPM | HEIGHT: 66 IN | SYSTOLIC BLOOD PRESSURE: 130 MMHG | TEMPERATURE: 97.1 F | OXYGEN SATURATION: 94 % | WEIGHT: 293 LBS

## 2022-05-03 DIAGNOSIS — J40 BRONCHITIS WITH ACUTE WHEEZING: Primary | ICD-10-CM

## 2022-05-03 PROCEDURE — 99212 OFFICE O/P EST SF 10 MIN: CPT | Performed by: FAMILY MEDICINE

## 2022-05-03 RX ORDER — DIPHENHYDRAMINE HCL 25 MG
50 CAPSULE ORAL EVERY 6 HOURS PRN
COMMUNITY
End: 2022-07-29

## 2022-05-03 RX ORDER — METHYLPREDNISOLONE 4 MG/1
TABLET ORAL
Qty: 21 EACH | Refills: 0 | Status: SHIPPED | OUTPATIENT
Start: 2022-05-03 | End: 2022-07-22

## 2022-05-03 RX ORDER — CEFUROXIME AXETIL 500 MG/1
500 TABLET ORAL 2 TIMES DAILY
Qty: 20 TABLET | Refills: 0 | Status: SHIPPED | OUTPATIENT
Start: 2022-05-03 | End: 2022-07-22

## 2022-05-03 NOTE — PROGRESS NOTES
"Ju Arteaga is a 32 y.o. female.     Chief Complaint   Patient presents with   • Establish Care   • Earache     4 months     • Shortness of Breath     1 weeks     • Cough     1 week.  Taking mucinex.  Had recent Covid test.  Positive Strep in April       Visit Vitals  /88   Pulse 79   Temp 97.1 °F (36.2 °C)   Ht 168.3 cm (66.25\")   Wt (!) 153 kg (338 lb)   LMP 04/26/2022 (Approximate)   SpO2 94%   Breastfeeding No   BMI 54.14 kg/m²         Earache   There is pain in both ears. This is a recurrent problem. The current episode started in the past 7 days. The problem has been gradually worsening. There has been no fever. The pain is at a severity of 5/10 (improved). The pain is moderate. Associated symptoms include coughing, diarrhea and rhinorrhea. Pertinent negatives include no abdominal pain, ear discharge, headaches, hearing loss, neck pain, rash, sore throat or vomiting. She has tried antibiotics and NSAIDs for the symptoms. The treatment provided moderate relief. There is no history of a chronic ear infection, hearing loss or a tympanostomy tube.   Shortness of Breath  This is a recurrent problem. The current episode started in the past 7 days. The problem occurs every few minutes. The problem has been rapidly worsening. Associated symptoms include chest pain (rib pain from coughing), coryza, ear pain (bilateral), rhinorrhea, sputum production and wheezing. Pertinent negatives include no abdominal pain, claudication, fever, headaches, hemoptysis, leg pain, leg swelling, neck pain, orthopnea, PND, rash, sore throat, swollen glands, syncope or vomiting. The symptoms are aggravated by exercise. Risk factors include oral contraceptive. She has tried beta agonist inhalers for the symptoms. There is no history of allergies, aspirin allergies, asthma, bronchiolitis, CAD, chronic lung disease, COPD, DVT, a heart failure, PE, pneumonia or a recent surgery.   Cough  This is a recurrent problem. The " current episode started in the past 7 days. The problem has been gradually worsening. The problem occurs every few minutes. The cough is productive of purulent sputum. Associated symptoms include chest pain (rib pain from coughing), ear congestion, ear pain (bilateral), nasal congestion, postnasal drip, rhinorrhea, shortness of breath and wheezing. Pertinent negatives include no chills, eye redness, fever, headaches, heartburn, hemoptysis, myalgias, rash, sore throat, sweats or weight loss. The symptoms are aggravated by pollens and exercise. Risk factors for lung disease include smoking/tobacco exposure. She has tried a beta-agonist inhaler for the symptoms. The treatment provided mild relief. Her past medical history is significant for bronchitis and environmental allergies. There is no history of asthma, bronchiectasis, COPD, emphysema or pneumonia.      Pt here to establish.   Pt has cough for the past week.  Pt is smoker.  Pt had recent ear infection.   Pt has not had Covid vaccine.  Pt tested for strep and Covid early April.   Pt taking left over amoxil.   Pt is getting shortness of breath.  Pt works for [employer] and stays active.   Pt has had wheezing.     Pt had period started 9 days ago.  Pt had been taking hormones such as Clomid to help with pregnancy.  Pt is not caring for 15 yo step daughter and changed her mind about becoming pregnant.    The following portions of the patient's history were reviewed and updated as appropriate: allergies, current medications, past family history, past medical history, past social history, past surgical history and problem list.    Past Medical History:   Diagnosis Date   • Anxiety    • Depression    • PCOS (polycystic ovarian syndrome)       Past Surgical History:   Procedure Laterality Date   • WISDOM TOOTH EXTRACTION        Family History   Problem Relation Age of Onset   • Drug abuse Mother    • Alcohol abuse Mother    • Drug abuse Father    • Alcohol abuse Father        Social History     Socioeconomic History   • Marital status:    Tobacco Use   • Smoking status: Current Every Day Smoker     Packs/day: 1.00     Years: 15.00     Pack years: 15.00     Types: Cigarettes   • Smokeless tobacco: Never Used   Vaping Use   • Vaping Use: Never used   Substance and Sexual Activity   • Alcohol use: Not Currently   • Drug use: No   • Sexual activity: Yes     Partners: Male     Birth control/protection: None      No Known Allergies    Review of Systems   Constitutional: Negative.  Negative for chills, diaphoresis, fatigue, fever and weight loss.   HENT: Positive for congestion, ear pain (bilateral), postnasal drip and rhinorrhea. Negative for ear discharge, hearing loss, nosebleeds, sinus pressure, sinus pain, sneezing and sore throat.    Eyes: Negative.  Negative for redness and itching.   Respiratory: Positive for cough, sputum production, shortness of breath and wheezing. Negative for hemoptysis.    Cardiovascular: Positive for chest pain (rib pain from coughing). Negative for palpitations, orthopnea, claudication, leg swelling, syncope and PND.   Gastrointestinal: Positive for diarrhea. Negative for abdominal pain, constipation, heartburn, nausea and vomiting.   Endocrine: Negative.  Negative for cold intolerance and heat intolerance.   Genitourinary: Negative.  Negative for dysuria, frequency, hematuria and urgency.   Musculoskeletal: Negative.  Negative for arthralgias, back pain, myalgias and neck pain.   Skin: Negative.  Negative for color change and rash.   Allergic/Immunologic: Positive for environmental allergies.   Neurological: Negative.  Negative for dizziness, syncope, light-headedness and headaches.   Hematological: Negative.  Negative for adenopathy. Does not bruise/bleed easily.   Psychiatric/Behavioral: Positive for sleep disturbance. Negative for dysphoric mood. The patient is not nervous/anxious.        Objective   Physical Exam  Vitals and nursing note reviewed.    Constitutional:       Appearance: She is well-developed.   HENT:      Head: Normocephalic.      Right Ear: Tympanic membrane, ear canal and external ear normal.      Left Ear: Tympanic membrane, ear canal and external ear normal.      Nose: Congestion and rhinorrhea present.      Right Sinus: No maxillary sinus tenderness or frontal sinus tenderness.      Left Sinus: No maxillary sinus tenderness or frontal sinus tenderness.      Mouth/Throat:      Lips: Pink.      Mouth: Mucous membranes are moist. No injury.      Tongue: No lesions.      Palate: No mass.      Pharynx: Uvula midline. Posterior oropharyngeal erythema (slight irritation) present. No pharyngeal swelling, oropharyngeal exudate or uvula swelling.      Tonsils: No tonsillar exudate or tonsillar abscesses. 1+ on the right. 1+ on the left.   Eyes:      General: Lids are normal.      Conjunctiva/sclera: Conjunctivae normal.      Pupils: Pupils are equal, round, and reactive to light.   Neck:      Thyroid: No thyroid mass or thyromegaly.      Vascular: No carotid bruit.      Trachea: Trachea normal.   Cardiovascular:      Rate and Rhythm: Normal rate and regular rhythm.      Heart sounds: No murmur heard.  Pulmonary:      Effort: Pulmonary effort is normal. No respiratory distress.      Breath sounds: Wheezing and rhonchi present. No decreased breath sounds or rales.      Comments: Wet sounding cough  Chest:      Chest wall: No tenderness.   Abdominal:      General: Bowel sounds are normal.      Palpations: Abdomen is soft.      Tenderness: There is no abdominal tenderness.   Musculoskeletal:         General: Normal range of motion.      Cervical back: Normal range of motion and neck supple.   Skin:     General: Skin is warm and dry.   Neurological:      Mental Status: She is alert and oriented to person, place, and time.   Psychiatric:         Behavior: Behavior normal.         Assessment/Plan   Diagnoses and all orders for this visit:    1. Bronchitis  with acute wheezing (Primary)  -     methylPREDNISolone (MEDROL) 4 MG dose pack; Take as directed on package instructions.  Dispense: 21 each; Refill: 0  -     cefuroxime (CEFTIN) 500 MG tablet; Take 1 tablet by mouth 2 (Two) Times a Day.  Dispense: 20 tablet; Refill: 0      Continue the albuterol    Discussed smoking cessation-had out on smoking cessation.          Current Outpatient Medications:   •  albuterol sulfate  (90 Base) MCG/ACT inhaler, Inhale 2 puffs Every 6 (Six) Hours As Needed for Wheezing., Disp: 18 g, Rfl: 0  •  Melatonin 1 MG chewable tablet, Chew 20 mg Every Night., Disp: , Rfl:   •  cefuroxime (CEFTIN) 500 MG tablet, Take 1 tablet by mouth 2 (Two) Times a Day., Disp: 20 tablet, Rfl: 0  •  diphenhydrAMINE (BENADRYL) 25 mg capsule, Take 50 mg by mouth Every 6 (Six) Hours As Needed for Itching., Disp: , Rfl:   •  methylPREDNISolone (MEDROL) 4 MG dose pack, Take as directed on package instructions., Disp: 21 each, Rfl: 0    Return if symptoms worsen or fail to improve, for Recheck.

## 2022-07-16 ENCOUNTER — HOSPITAL ENCOUNTER (EMERGENCY)
Facility: HOSPITAL | Age: 32
Discharge: HOME | End: 2022-07-16
Attending: EMERGENCY MEDICINE | Admitting: EMERGENCY MEDICINE

## 2022-07-16 VITALS
SYSTOLIC BLOOD PRESSURE: 164 MMHG | RESPIRATION RATE: 16 BRPM | TEMPERATURE: 98.1 F | DIASTOLIC BLOOD PRESSURE: 98 MMHG | HEART RATE: 97 BPM | WEIGHT: 293 LBS | BODY MASS INDEX: 44.41 KG/M2 | OXYGEN SATURATION: 99 % | HEIGHT: 68 IN

## 2022-07-16 DIAGNOSIS — F41.9 ANXIETY: ICD-10-CM

## 2022-07-16 DIAGNOSIS — I10 ESSENTIAL HYPERTENSION: Primary | ICD-10-CM

## 2022-07-16 PROCEDURE — 99282 EMERGENCY DEPT VISIT SF MDM: CPT

## 2022-07-16 PROCEDURE — 93005 ELECTROCARDIOGRAM TRACING: CPT | Performed by: EMERGENCY MEDICINE

## 2022-07-16 RX ORDER — LISINOPRIL 10 MG/1
10 TABLET ORAL DAILY
Qty: 30 TABLET | Refills: 0 | Status: SHIPPED | OUTPATIENT
Start: 2022-07-16 | End: 2022-07-22

## 2022-07-16 NOTE — ED PROVIDER NOTES
Subjective   32-year-old female who presents for evaluation of difficulty sleeping, restlessness, fatigue, chest pain.  The patient reports that she has been seen in 2 previous ERs prior to today's presentation.  In the 2 previous ER she has had a combination of chest x-ray, CT angiogram of the chest, multiple lab checks, EKGs, and urine evaluation.  She states that all previous evaluation has been reported to her as negative.  She has been prescribed hydroxyzine to take for suspected anxiety and has been advised to follow-up with a psychiatrist.  She reports that she went to the [facility], and actually has an outpatient appointment on Monday, 2 days from now to initiate outpatient therapeutic treatment for anxiety.  The [facility] likewise feels anxiety is a contributor.  She states that they have not initiated any new medications at this given time but may as the therapy sessions continue.  She denies any illicit drug use or stimulant use.  She denies a history of coronary artery disease.  She denies a known preceding history of high blood pressure but does report that her blood pressure has been elevated on each presentation.  She has not been prescribed any blood pressure medication at this given time.  No reported urine symptoms include no dysuria, frequency, urgency.  No reported sick contacts.  She denies fever, body aches, or chills.          Review of Systems   Constitutional: Positive for fatigue. Negative for chills and fever.   HENT: Negative for congestion, ear pain, postnasal drip, sinus pressure and sore throat.    Eyes: Negative for pain, redness and visual disturbance.   Respiratory: Negative for cough, chest tightness and shortness of breath.    Cardiovascular: Positive for chest pain. Negative for palpitations and leg swelling.   Gastrointestinal: Negative for abdominal pain, anal bleeding, blood in stool, diarrhea, nausea and vomiting.   Endocrine: Negative for polydipsia and polyuria.   Genitourinary:  Negative for difficulty urinating, dysuria, frequency and urgency.   Musculoskeletal: Negative for arthralgias, back pain and neck pain.   Skin: Negative for pallor and rash.   Allergic/Immunologic: Negative for environmental allergies and immunocompromised state.   Neurological: Negative for dizziness, weakness and headaches.   Hematological: Negative for adenopathy.   Psychiatric/Behavioral: Negative for confusion, self-injury and suicidal ideas. The patient is nervous/anxious.    All other systems reviewed and are negative.      Past Medical History:   Diagnosis Date   • Anxiety    • Depression    • PCOS (polycystic ovarian syndrome)        No Known Allergies    Past Surgical History:   Procedure Laterality Date   • WISDOM TOOTH EXTRACTION         Family History   Problem Relation Age of Onset   • Drug abuse Mother    • Alcohol abuse Mother    • Drug abuse Father    • Alcohol abuse Father        Social History     Socioeconomic History   • Marital status:    Tobacco Use   • Smoking status: Current Every Day Smoker     Packs/day: 1.00     Years: 15.00     Pack years: 15.00     Types: Cigarettes   • Smokeless tobacco: Never Used   Vaping Use   • Vaping Use: Never used   Substance and Sexual Activity   • Alcohol use: Not Currently   • Drug use: No   • Sexual activity: Yes     Partners: Male     Birth control/protection: None           Objective   Physical Exam  Vitals and nursing note reviewed.   Constitutional:       General: She is not in acute distress.     Appearance: Normal appearance. She is well-developed. She is not toxic-appearing or diaphoretic.   HENT:      Head: Normocephalic and atraumatic.      Right Ear: External ear normal.      Left Ear: External ear normal.      Nose: Nose normal.   Eyes:      General: Lids are normal.      Pupils: Pupils are equal, round, and reactive to light.   Neck:      Trachea: No tracheal deviation.   Cardiovascular:      Rate and Rhythm: Normal rate and regular  rhythm.      Pulses: No decreased pulses.      Heart sounds: Normal heart sounds. No murmur heard.    No friction rub. No gallop.   Pulmonary:      Effort: Pulmonary effort is normal. No respiratory distress.      Breath sounds: Normal breath sounds. No decreased breath sounds, wheezing, rhonchi or rales.   Abdominal:      General: Bowel sounds are normal.      Palpations: Abdomen is soft.      Tenderness: There is no abdominal tenderness. There is no guarding or rebound.   Musculoskeletal:         General: No deformity. Normal range of motion.      Cervical back: Normal range of motion and neck supple.   Lymphadenopathy:      Cervical: No cervical adenopathy.   Skin:     General: Skin is warm and dry.      Findings: No rash.   Neurological:      Mental Status: She is alert and oriented to person, place, and time.      Cranial Nerves: No cranial nerve deficit.      Sensory: No sensory deficit.   Psychiatric:         Speech: Speech normal.         Behavior: Behavior normal.         Thought Content: Thought content normal.         Judgment: Judgment normal.         Procedures           ED Course                                           MDM  Number of Diagnoses or Management Options  Anxiety: new and requires workup  Essential hypertension: new and requires workup  Diagnosis management comments: EKG shows no acute abnormalities.  The patient per report has had extensive outpatient evaluation including CT angio of the chest, multiple cardiac enzyme rechecks, and lab checks.    I will initiate lisinopril for suspected underlying essential hypertension.    I have advised the patient to take hydroxyzine as needed for anxiety and to keep outpatient follow-up with a therapist at the [facility] for further evaluation and potential further medication initiation if warranted.    Discharged home appearing well.       Amount and/or Complexity of Data Reviewed  Tests in the medicine section of CPT®: ordered and reviewed  Review and  summarize past medical records: yes  Independent visualization of images, tracings, or specimens: yes        Final diagnoses:   Essential hypertension   Anxiety       ED Disposition  ED Disposition     ED Disposition   Discharge    Condition   Stable    Comment   --             Marleny Mathis MD  [address] [address]  [phone]    In 1 week           Medication List      New Prescriptions    lisinopril 10 MG tablet  Commonly known as: PRINIVIL,ZESTRIL  Take 1 tablet by mouth Daily for 30 days.           Where to Get Your Medications      These medications were sent to [facility]/pharmacy #[id number] - [place], [place] - [address] [phone] [facility] [phone]   [address] [address]    Hours: 24-hours Phone: [phone]   · lisinopril 10 MG tablet          Ta Antonio MD  07/16/22 0757

## 2022-07-16 NOTE — DISCHARGE INSTRUCTIONS
Lisinopril as prescribed for treatment of hypertension.    Take hydroxyzine as needed for anxiety.    Keep outpatient follow-up with your therapist on Monday.    Return to the ER as needed.

## 2022-07-17 LAB
QT INTERVAL: 344 MS
QTC INTERVAL: 446 MS

## 2022-07-22 ENCOUNTER — TELEMEDICINE (OUTPATIENT)
Dept: INTERNAL MEDICINE | Facility: CLINIC | Age: 32
End: 2022-07-22

## 2022-07-22 DIAGNOSIS — U07.1 COVID-19: Primary | ICD-10-CM

## 2022-07-22 DIAGNOSIS — R19.7 DIARRHEA, UNSPECIFIED TYPE: ICD-10-CM

## 2022-07-22 DIAGNOSIS — R05.9 COUGH: ICD-10-CM

## 2022-07-22 PROCEDURE — 99214 OFFICE O/P EST MOD 30 MIN: CPT | Performed by: NURSE PRACTITIONER

## 2022-07-22 RX ORDER — BROMPHENIRAMINE MALEATE, PSEUDOEPHEDRINE HYDROCHLORIDE, AND DEXTROMETHORPHAN HYDROBROMIDE 2; 30; 10 MG/5ML; MG/5ML; MG/5ML
10 SYRUP ORAL 4 TIMES DAILY PRN
Qty: 300 ML | Refills: 0 | Status: SHIPPED | OUTPATIENT
Start: 2022-07-22 | End: 2022-07-29

## 2022-07-22 RX ORDER — LOPERAMIDE HYDROCHLORIDE 2 MG/1
2 TABLET ORAL 4 TIMES DAILY PRN
Qty: 90 TABLET | Refills: 0 | Status: SHIPPED | OUTPATIENT
Start: 2022-07-22 | End: 2022-08-18

## 2022-07-22 NOTE — PROGRESS NOTES
Subjective   Geeta Arteaga is a 32 y.o. female    Chief Complaint   Patient presents with   • covid 19 positive   • Diarrhea     History of Present Illness     This was an audio and video enabled telemedicine encounter.    You have chosen to receive care through a telehealth visit.  Do you consent to use a video/audio connection for your medical care today? Yes    Pt was seen in the ER on 7/20/22 and dx'd with COVID on 7/20/22.  Pt c/o fatigue, chills, fever, diarrhea.  + cough, congestion.  Tmax 103.2.  Has been taking OTC Ibuprofen and tylenol for sx's w/o relief of sx's.  She has not tried any meds for her diarrhea.  No CP or SOA.        The following portions of the patient's history were reviewed and updated as appropriate: allergies, current medications, past family history, past medical history, past social history, past surgical history and problem list.    Current Outpatient Medications:   •  albuterol sulfate  (90 Base) MCG/ACT inhaler, Inhale 2 puffs Every 6 (Six) Hours As Needed for Wheezing., Disp: 18 g, Rfl: 0  •  brompheniramine-pseudoephedrine-DM 30-2-10 MG/5ML syrup, Take 10 mL by mouth 4 (Four) Times a Day As Needed for Congestion or Cough., Disp: 300 mL, Rfl: 0  •  diphenhydrAMINE (BENADRYL) 25 mg capsule, Take 50 mg by mouth Every 6 (Six) Hours As Needed for Itching., Disp: , Rfl:   •  loperamide (Imodium A-D) 2 MG tablet, Take 1 tablet by mouth 4 (Four) Times a Day As Needed for Diarrhea., Disp: 90 tablet, Rfl: 0  •  Melatonin 1 MG chewable tablet, Chew 20 mg Every Night., Disp: , Rfl:      Review of Systems   Constitutional: Positive for chills, fatigue and fever.   HENT: Positive for congestion and sore throat.    Respiratory: Positive for cough. Negative for chest tightness and shortness of breath.    Cardiovascular: Negative for chest pain.   Gastrointestinal: Positive for diarrhea. Negative for abdominal pain, nausea and vomiting.   Endocrine: Negative for cold intolerance and heat  intolerance.   Musculoskeletal: Positive for arthralgias and myalgias.   Neurological: Positive for headaches. Negative for dizziness.       Objective   Physical Exam  Constitutional:       Appearance: Normal appearance.   HENT:      Head: Normocephalic.      Nose: Congestion present.      Mouth/Throat:      Mouth: Mucous membranes are moist.      Pharynx: Posterior oropharyngeal erythema present.   Eyes:      Pupils: Pupils are equal, round, and reactive to light.   Pulmonary:      Effort: Pulmonary effort is normal.   Musculoskeletal:         General: Normal range of motion.      Cervical back: Normal range of motion.   Neurological:      Mental Status: She is alert and oriented to person, place, and time.   Psychiatric:         Behavior: Behavior normal.       There were no vitals filed for this visit.      Assessment & Plan   Diagnoses and all orders for this visit:    1. COVID-19 (Primary)    2. Cough  -     brompheniramine-pseudoephedrine-DM 30-2-10 MG/5ML syrup; Take 10 mL by mouth 4 (Four) Times a Day As Needed for Congestion or Cough.  Dispense: 300 mL; Refill: 0    3. Diarrhea, unspecified type  -     loperamide (Imodium A-D) 2 MG tablet; Take 1 tablet by mouth 4 (Four) Times a Day As Needed for Diarrhea.  Dispense: 90 tablet; Refill: 0      Meds as directed  Symptomatic management  Increase fluids  Quarantine x 10 days since she is not vaccinated  Return if symptoms worsen or fail to improve.

## 2022-07-25 ENCOUNTER — TELEPHONE (OUTPATIENT)
Dept: INTERNAL MEDICINE | Facility: CLINIC | Age: 32
End: 2022-07-25

## 2022-07-25 NOTE — TELEPHONE ENCOUNTER
Hub staff attempted to follow warm transfer process and was unsuccessful     Caller: Geeta Arteaga    Relationship to patient: Self    Best call back number: [phone]     Patient is needing: PATIENT CALLED WANTING TO SCHEDULE A MYCHART VISIT.  PATIENT TESTED POSITIVE FOR COVID 7-22-22.  PATIENT HAS SWOLLEN TONSILS, SCRATCHY RED THROAT, PAIN IN LEGS.

## 2022-07-25 NOTE — TELEPHONE ENCOUNTER
Called pt to help her schedule her video appointment.  LM to call back and let them know to schedule an appointment

## 2022-07-27 ENCOUNTER — TELEMEDICINE (OUTPATIENT)
Dept: INTERNAL MEDICINE | Facility: CLINIC | Age: 32
End: 2022-07-27

## 2022-07-27 DIAGNOSIS — F41.9 ANXIETY: Primary | ICD-10-CM

## 2022-07-27 DIAGNOSIS — F41.0 PANIC: ICD-10-CM

## 2022-07-27 DIAGNOSIS — G47.00 INSOMNIA, UNSPECIFIED TYPE: ICD-10-CM

## 2022-07-27 PROCEDURE — 99214 OFFICE O/P EST MOD 30 MIN: CPT | Performed by: FAMILY MEDICINE

## 2022-07-27 RX ORDER — AMITRIPTYLINE HYDROCHLORIDE 10 MG/1
10 TABLET, FILM COATED ORAL NIGHTLY PRN
Qty: 15 TABLET | Refills: 0 | Status: SHIPPED | OUTPATIENT
Start: 2022-07-27 | End: 2022-08-10

## 2022-07-27 NOTE — PROGRESS NOTES
Subjective   Geeta Arteaga is a 32 y.o. female.     Chief Complaint   Patient presents with   • Anxiety     You have chosen to receive care through a telehealth visit.  Do you consent to use a video/audio connection for your medical care today? Yes    This was an audio and video enabled telemedicine encounter.    Pt is at home in [facility]. I am in my office in [place]    Visit Vitals  LMP 06/30/2022 (Approximate)         Anxiety  Presents for initial visit. Onset was 1 to 6 months ago. The problem has been unchanged. Symptoms include insomnia, nausea, nervous/anxious behavior, panic and restlessness. Patient reports no chest pain, compulsions, confusion, decreased concentration, depressed mood, dizziness, dry mouth, excessive worry, feeling of choking, hyperventilation, irritability, malaise, muscle tension, obsessions, palpitations, shortness of breath or suicidal ideas. Symptoms occur constantly. The severity of symptoms is severe. Nothing aggravates the symptoms. The quality of sleep is poor.     Risk factors include a major life event and recent illness. Her past medical history is significant for anxiety/panic attacks (childhood), bipolar disorder (as child) and depression (as child). There is no history of anemia, arrhythmia, asthma, CAD, CHF, chronic lung disease, fibromyalgia, hyperthyroidism or suicide attempts. Past treatments include SSRIs (lithium, prozac, dexadrine, adderall).      Pt has been having anxiety and is not sleeping at night.  Pt is not going anywhere.  Pt had Covid since 7/20/22. Pt has not current Covid symptoms.  Pt has been on visteril.  Pt went to [employer] and they wanted her to do a 4 hour per day program.  Pt is not back to work.     Pt had a panic attack at a job interview for [employer] 1 month ago, for a new position. .  Pt is a  for [employer] and has not gone back to work. Pt states that she loves her job.    Pt is not able to sleep.   Pt had anxiety and depression and ADHD as a  child.   Pt denies marriage problems. Pt has a teenager.   Pt has stopped pop.  Pt has decreased appetite. Pt lost sense of taste, but still has sense of smell. Food tastes nasty since Covid.  [place].   ER gave her vistaril, pt feels that it makes her feel worse.   The following portions of the patient's history were reviewed and updated as appropriate: allergies, current medications, past family history, past medical history, past social history, past surgical history and problem list.    Past Medical History:   Diagnosis Date   • Anxiety    • Depression    • PCOS (polycystic ovarian syndrome)       Past Surgical History:   Procedure Laterality Date   • WISDOM TOOTH EXTRACTION        Family History   Problem Relation Age of Onset   • Drug abuse Mother    • Alcohol abuse Mother    • Drug abuse Father    • Alcohol abuse Father       Social History     Socioeconomic History   • Marital status:    Tobacco Use   • Smoking status: Current Every Day Smoker     Packs/day: 1.00     Years: 15.00     Pack years: 15.00     Types: Cigarettes   • Smokeless tobacco: Never Used   Vaping Use   • Vaping Use: Never used   Substance and Sexual Activity   • Alcohol use: Not Currently   • Drug use: No   • Sexual activity: Yes     Partners: Male     Birth control/protection: None      No Known Allergies    Review of Systems   Constitutional: Negative for irritability.   Respiratory: Positive for chest tightness (with panic attacks). Negative for shortness of breath.    Cardiovascular: Negative for chest pain and palpitations.   Gastrointestinal: Positive for nausea.   Neurological: Negative for dizziness.   Psychiatric/Behavioral: Negative for confusion, decreased concentration and suicidal ideas. The patient is nervous/anxious and has insomnia.        Objective   Physical Exam  Constitutional:       Appearance: Normal appearance.   HENT:      Head: Normocephalic.   Eyes:       Extraocular Movements: Extraocular movements intact.   Pulmonary:      Effort: Pulmonary effort is normal. No respiratory distress.   Neurological:      Mental Status: She is alert.   Psychiatric:         Attention and Perception: Attention normal.         Mood and Affect: Mood normal.         Speech: Speech normal.         Behavior: Behavior normal. Behavior is cooperative.         Cognition and Memory: Cognition normal.         Judgment: Judgment normal.         Assessment & Plan   Diagnoses and all orders for this visit:    1. Anxiety (Primary)  -     Ambulatory Referral to Behavioral Health  -     sertraline (Zoloft) 50 MG tablet; Take 1 tablet by mouth Daily.  Dispense: 30 tablet; Refill: 1    2. Panic  -     Ambulatory Referral to Behavioral Health  -     sertraline (Zoloft) 50 MG tablet; Take 1 tablet by mouth Daily.  Dispense: 30 tablet; Refill: 1    3. Insomnia, unspecified type  -     amitriptyline (ELAVIL) 10 MG tablet; Take 1 tablet by mouth At Night As Needed for Sleep.  Dispense: 15 tablet; Refill: 0      Ok to continue vistaril.   Discussed with pt the risk of suicide, with depression or anxiety. Discussed importance of calling or going to ER if feeling suicidal. Pt currently denies suicidal ideation and agreed with plan is she becomes suicidal. Pt states that she would not commit suicide.          I spent 29 minutes caring for Geeta on this date of service. This time includes time spent by me in the following activities: preparing for the visit, reviewing tests, obtaining and/or reviewing a separately obtained history, performing a medically appropriate examination and/or evaluation, counseling and educating the patient/family/caregiver, referring and communicating with other health care professionals and documenting information in the medical record    Current Outpatient Medications:   •  albuterol sulfate  (90 Base) MCG/ACT inhaler, Inhale 2 puffs Every 6 (Six) Hours As Needed for  Wheezing., Disp: 18 g, Rfl: 0  •  amitriptyline (ELAVIL) 10 MG tablet, Take 1 tablet by mouth At Night As Needed for Sleep., Disp: 15 tablet, Rfl: 0  •  brompheniramine-pseudoephedrine-DM 30-2-10 MG/5ML syrup, Take 10 mL by mouth 4 (Four) Times a Day As Needed for Congestion or Cough., Disp: 300 mL, Rfl: 0  •  diphenhydrAMINE (BENADRYL) 25 mg capsule, Take 50 mg by mouth Every 6 (Six) Hours As Needed for Itching., Disp: , Rfl:   •  loperamide (Imodium A-D) 2 MG tablet, Take 1 tablet by mouth 4 (Four) Times a Day As Needed for Diarrhea., Disp: 90 tablet, Rfl: 0  •  Melatonin 1 MG chewable tablet, Chew 20 mg Every Night., Disp: , Rfl:   •  sertraline (Zoloft) 50 MG tablet, Take 1 tablet by mouth Daily., Disp: 30 tablet, Rfl: 1    Return in about 1 week (around 8/3/2022), or if symptoms worsen or fail to improve, for Recheck.

## 2022-07-28 ENCOUNTER — PATIENT MESSAGE (OUTPATIENT)
Dept: INTERNAL MEDICINE | Facility: CLINIC | Age: 32
End: 2022-07-28

## 2022-07-28 ENCOUNTER — TELEPHONE (OUTPATIENT)
Dept: INTERNAL MEDICINE | Facility: CLINIC | Age: 32
End: 2022-07-28

## 2022-07-28 RX ORDER — ONDANSETRON 4 MG/1
4 TABLET, ORALLY DISINTEGRATING ORAL EVERY 8 HOURS PRN
Qty: 12 TABLET | Refills: 1 | Status: SHIPPED | OUTPATIENT
Start: 2022-07-28 | End: 2022-08-10

## 2022-07-28 NOTE — TELEPHONE ENCOUNTER
See other message.  Patient should cut the sertraline in half.  For the sleep we will try increasing the amitriptyline to 20 mg at night.

## 2022-07-28 NOTE — TELEPHONE ENCOUNTER
Zofran sent to pharmacy [facility].  Try cutting the sertraline in half for the next week. Then increase to 50 mg(full pill)

## 2022-07-28 NOTE — TELEPHONE ENCOUNTER
Caller: Geeta Arteaga    Relationship: Self    Best call back number: [phone]    What medications are you currently taking:   Current Outpatient Medications on File Prior to Visit   Medication Sig Dispense Refill   • albuterol sulfate  (90 Base) MCG/ACT inhaler Inhale 2 puffs Every 6 (Six) Hours As Needed for Wheezing. 18 g 0   • amitriptyline (ELAVIL) 10 MG tablet Take 1 tablet by mouth At Night As Needed for Sleep. 15 tablet 0   • brompheniramine-pseudoephedrine-DM 30-2-10 MG/5ML syrup Take 10 mL by mouth 4 (Four) Times a Day As Needed for Congestion or Cough. 300 mL 0   • diphenhydrAMINE (BENADRYL) 25 mg capsule Take 50 mg by mouth Every 6 (Six) Hours As Needed for Itching.     • loperamide (Imodium A-D) 2 MG tablet Take 1 tablet by mouth 4 (Four) Times a Day As Needed for Diarrhea. 90 tablet 0   • Melatonin 1 MG chewable tablet Chew 20 mg Every Night.     • ondansetron ODT (Zofran ODT) 4 MG disintegrating tablet Place 1 tablet on the tongue Every 8 (Eight) Hours As Needed for Nausea or Vomiting. 12 tablet 1   • sertraline (Zoloft) 50 MG tablet Take 1 tablet by mouth Daily. 30 tablet 1     No current facility-administered medications on file prior to visit.          When did you start taking these medications: RECENTLY    Which medication are you concerned about: ZOLOFT    Who prescribed you this medication: VERENA    What are your concerns: PATIENT STATES THAT SHE IS GENERALLY NOT FEELING WELL ON THE MEDICATION.    How long have you had these concerns: SINCE THIS MORNING

## 2022-07-28 NOTE — TELEPHONE ENCOUNTER
DELETE AFTER REVIEWING: Telephone encounter to be sent to the clinical pool     Caller: Jenni Geeta    Relationship: Self    Best call back number: [phone]    What medications are you currently taking:   Current Outpatient Medications on File Prior to Visit   Medication Sig Dispense Refill   • albuterol sulfate  (90 Base) MCG/ACT inhaler Inhale 2 puffs Every 6 (Six) Hours As Needed for Wheezing. 18 g 0   • amitriptyline (ELAVIL) 10 MG tablet Take 1 tablet by mouth At Night As Needed for Sleep. 15 tablet 0   • brompheniramine-pseudoephedrine-DM 30-2-10 MG/5ML syrup Take 10 mL by mouth 4 (Four) Times a Day As Needed for Congestion or Cough. 300 mL 0   • diphenhydrAMINE (BENADRYL) 25 mg capsule Take 50 mg by mouth Every 6 (Six) Hours As Needed for Itching.     • loperamide (Imodium A-D) 2 MG tablet Take 1 tablet by mouth 4 (Four) Times a Day As Needed for Diarrhea. 90 tablet 0   • Melatonin 1 MG chewable tablet Chew 20 mg Every Night.     • sertraline (Zoloft) 50 MG tablet Take 1 tablet by mouth Daily. 30 tablet 1     No current facility-administered medications on file prior to visit.          When did you start taking these medications: TODAY 7/28/22    Which medication are you concerned about:sertraline (Zoloft) 50 MG tablet    Who prescribed you this medication:  DR. ALBARADO    What are your concerns: PATIENT STARTED TAKING THIS MEDICATION THIS MORNING AND WANTED TO KNOW IF SHE COULD GET SOMETHING PRESCRIBED FOR NAUSEA. PATIENT SAYS SHE IS NOW FEELING LIGHTHEADED AND ZONED OUT AND LOOPY.    How long have you had these concerns: TODAY

## 2022-07-28 NOTE — TELEPHONE ENCOUNTER
Loliat Rangel 7/28/2022 3:48 PM EDT      ----- Message -----  From: Geeta Arteaga  Sent: 7/28/2022 3:25 PM EDT  To: [facility] [address] Clinical Pool  Subject: Hey     Sorry to bother u but I feel very irritable and I feel like it has gotten worse with meds and still haven't been able to sleep

## 2022-07-29 RX ORDER — HYDROXYZINE PAMOATE 25 MG/1
25 CAPSULE ORAL NIGHTLY PRN
Qty: 30 CAPSULE | Refills: 1 | Status: SHIPPED | OUTPATIENT
Start: 2022-07-29 | End: 2022-09-22

## 2022-08-12 ENCOUNTER — TELEPHONE (OUTPATIENT)
Dept: URGENT CARE | Facility: CLINIC | Age: 32
End: 2022-08-12

## 2022-08-12 NOTE — TELEPHONE ENCOUNTER
Patient called in, hasn't  the medication because it $ 450.00, & its not covered by her insurance. Would like to get the medication change.

## 2022-08-16 ENCOUNTER — HOSPITAL ENCOUNTER (EMERGENCY)
Facility: HOSPITAL | Age: 32
Discharge: HOME | End: 2022-08-16
Attending: EMERGENCY MEDICINE | Admitting: EMERGENCY MEDICINE

## 2022-08-16 VITALS
TEMPERATURE: 98.5 F | BODY MASS INDEX: 44.41 KG/M2 | SYSTOLIC BLOOD PRESSURE: 136 MMHG | DIASTOLIC BLOOD PRESSURE: 80 MMHG | HEIGHT: 68 IN | WEIGHT: 293 LBS | RESPIRATION RATE: 18 BRPM | OXYGEN SATURATION: 96 % | HEART RATE: 95 BPM

## 2022-08-16 DIAGNOSIS — K62.5 BRBPR (BRIGHT RED BLOOD PER RECTUM): ICD-10-CM

## 2022-08-16 DIAGNOSIS — E66.01 CLASS 3 SEVERE OBESITY DUE TO EXCESS CALORIES WITHOUT SERIOUS COMORBIDITY WITH BODY MASS INDEX (BMI) OF 50.0 TO 59.9 IN ADULT: ICD-10-CM

## 2022-08-16 DIAGNOSIS — U09.9 POST COVID-19 CONDITION, UNSPECIFIED: ICD-10-CM

## 2022-08-16 DIAGNOSIS — R07.9 CHEST PAIN IN ADULT: Primary | ICD-10-CM

## 2022-08-16 LAB
ALBUMIN SERPL-MCNC: 4.5 G/DL (ref 3.5–5.2)
ALBUMIN/GLOB SERPL: 1.3 G/DL
ALP SERPL-CCNC: 74 U/L (ref 39–117)
ALT SERPL W P-5'-P-CCNC: 14 U/L (ref 1–33)
ANION GAP SERPL CALCULATED.3IONS-SCNC: 11 MMOL/L (ref 5–15)
AST SERPL-CCNC: 21 U/L (ref 1–32)
BASOPHILS # BLD AUTO: 0.02 10*3/MM3 (ref 0–0.2)
BASOPHILS NFR BLD AUTO: 0.2 % (ref 0–1.5)
BILIRUB SERPL-MCNC: 0.3 MG/DL (ref 0–1.2)
BUN SERPL-MCNC: 9 MG/DL (ref 6–20)
BUN/CREAT SERPL: 11 (ref 7–25)
CALCIUM SPEC-SCNC: 10 MG/DL (ref 8.6–10.5)
CHLORIDE SERPL-SCNC: 103 MMOL/L (ref 98–107)
CO2 SERPL-SCNC: 27 MMOL/L (ref 22–29)
CREAT SERPL-MCNC: 0.82 MG/DL (ref 0.57–1)
D DIMER PPP FEU-MCNC: <0.27 MCGFEU/ML (ref 0.01–0.5)
DEPRECATED RDW RBC AUTO: 42.1 FL (ref 37–54)
EGFRCR SERPLBLD CKD-EPI 2021: 98.8 ML/MIN/1.73
EOSINOPHIL # BLD AUTO: 0.12 10*3/MM3 (ref 0–0.4)
EOSINOPHIL NFR BLD AUTO: 1 % (ref 0.3–6.2)
ERYTHROCYTE [DISTWIDTH] IN BLOOD BY AUTOMATED COUNT: 12.8 % (ref 12.3–15.4)
GLOBULIN UR ELPH-MCNC: 3.5 GM/DL
GLUCOSE SERPL-MCNC: 152 MG/DL (ref 65–99)
HCT VFR BLD AUTO: 45.7 % (ref 34–46.6)
HGB BLD-MCNC: 15.3 G/DL (ref 12–15.9)
HOLD SPECIMEN: NORMAL
HOLD SPECIMEN: NORMAL
IMM GRANULOCYTES # BLD AUTO: 0.05 10*3/MM3 (ref 0–0.05)
IMM GRANULOCYTES NFR BLD AUTO: 0.4 % (ref 0–0.5)
LYMPHOCYTES # BLD AUTO: 4.25 10*3/MM3 (ref 0.7–3.1)
LYMPHOCYTES NFR BLD AUTO: 36.5 % (ref 19.6–45.3)
MCH RBC QN AUTO: 30.1 PG (ref 26.6–33)
MCHC RBC AUTO-ENTMCNC: 33.5 G/DL (ref 31.5–35.7)
MCV RBC AUTO: 90 FL (ref 79–97)
MONOCYTES # BLD AUTO: 0.76 10*3/MM3 (ref 0.1–0.9)
MONOCYTES NFR BLD AUTO: 6.5 % (ref 5–12)
NEUTROPHILS NFR BLD AUTO: 55.4 % (ref 42.7–76)
NEUTROPHILS NFR BLD AUTO: 6.45 10*3/MM3 (ref 1.7–7)
NRBC BLD AUTO-RTO: 0 /100 WBC (ref 0–0.2)
NT-PROBNP SERPL-MCNC: 12.1 PG/ML (ref 0–450)
PLATELET # BLD AUTO: 266 10*3/MM3 (ref 140–450)
PMV BLD AUTO: 10.2 FL (ref 6–12)
POTASSIUM SERPL-SCNC: 4.7 MMOL/L (ref 3.5–5.2)
PROT SERPL-MCNC: 8 G/DL (ref 6–8.5)
QT INTERVAL: 330 MS
QTC INTERVAL: 430 MS
RBC # BLD AUTO: 5.08 10*6/MM3 (ref 3.77–5.28)
SODIUM SERPL-SCNC: 141 MMOL/L (ref 136–145)
TROPONIN T SERPL-MCNC: <0.01 NG/ML (ref 0–0.03)
WBC NRBC COR # BLD: 11.65 10*3/MM3 (ref 3.4–10.8)
WHOLE BLOOD HOLD COAG: NORMAL
WHOLE BLOOD HOLD SPECIMEN: NORMAL

## 2022-08-16 PROCEDURE — 85025 COMPLETE CBC W/AUTO DIFF WBC: CPT

## 2022-08-16 PROCEDURE — 80053 COMPREHEN METABOLIC PANEL: CPT

## 2022-08-16 PROCEDURE — 93005 ELECTROCARDIOGRAM TRACING: CPT

## 2022-08-16 PROCEDURE — 96374 THER/PROPH/DIAG INJ IV PUSH: CPT

## 2022-08-16 PROCEDURE — 84484 ASSAY OF TROPONIN QUANT: CPT | Performed by: EMERGENCY MEDICINE

## 2022-08-16 PROCEDURE — 85379 FIBRIN DEGRADATION QUANT: CPT | Performed by: EMERGENCY MEDICINE

## 2022-08-16 PROCEDURE — 93005 ELECTROCARDIOGRAM TRACING: CPT | Performed by: EMERGENCY MEDICINE

## 2022-08-16 PROCEDURE — 83880 ASSAY OF NATRIURETIC PEPTIDE: CPT | Performed by: EMERGENCY MEDICINE

## 2022-08-16 PROCEDURE — 99283 EMERGENCY DEPT VISIT LOW MDM: CPT

## 2022-08-16 RX ORDER — SODIUM CHLORIDE 0.9 % (FLUSH) 0.9 %
10 SYRINGE (ML) INJECTION AS NEEDED
Status: DISCONTINUED | OUTPATIENT
Start: 2022-08-16 | End: 2022-08-17 | Stop reason: HOSPADM

## 2022-08-16 RX ORDER — ALUMINA, MAGNESIA, AND SIMETHICONE 2400; 2400; 240 MG/30ML; MG/30ML; MG/30ML
15 SUSPENSION ORAL ONCE
Status: DISCONTINUED | OUTPATIENT
Start: 2022-08-16 | End: 2022-08-17 | Stop reason: HOSPADM

## 2022-08-16 RX ORDER — LIDOCAINE HYDROCHLORIDE 20 MG/ML
15 SOLUTION OROPHARYNGEAL ONCE
Status: DISCONTINUED | OUTPATIENT
Start: 2022-08-16 | End: 2022-08-17 | Stop reason: HOSPADM

## 2022-08-16 RX ORDER — PANTOPRAZOLE SODIUM 40 MG/10ML
40 INJECTION, POWDER, LYOPHILIZED, FOR SOLUTION INTRAVENOUS ONCE
Status: COMPLETED | OUTPATIENT
Start: 2022-08-16 | End: 2022-08-16

## 2022-08-16 RX ADMIN — PANTOPRAZOLE SODIUM 40 MG: 40 INJECTION, POWDER, FOR SOLUTION INTRAVENOUS at 22:30

## 2022-08-17 LAB — HOLD SPECIMEN: NORMAL

## 2022-08-17 NOTE — ED PROVIDER NOTES
Subjective   The patient presents to the emergency department with intermittent substernal chest pain that radiates through to the back.  Patient reports she was diagnosed with COVID in July and reports having symptoms on and off since that time.  Patient seen her primary care physician and they felt like it was associated with possible gastric ulcer and reflux.  Patient does report some occasional bright red blood per rectum.  She denies any focal abdominal pain.  Patient denies any leg pain or swelling.  No ongoing fever.  No history of body disorders.  No prior cardiac history.      History provided by:  Patient      Review of Systems   Constitutional: Negative.    Respiratory: Negative.    Cardiovascular: Positive for chest pain.   Gastrointestinal: Positive for blood in stool. Negative for abdominal pain, nausea and vomiting.   Musculoskeletal: Negative.    Skin: Negative.    Neurological: Negative.    Psychiatric/Behavioral: Negative.    All other systems reviewed and are negative.      Past Medical History:   Diagnosis Date   • Anxiety    • Depression    • PCOS (polycystic ovarian syndrome)        Allergies   Allergen Reactions   • Sertraline Irritability     Suicidal thoughts       Past Surgical History:   Procedure Laterality Date   • WISDOM TOOTH EXTRACTION         Family History   Problem Relation Age of Onset   • Drug abuse Mother    • Alcohol abuse Mother    • Drug abuse Father    • Alcohol abuse Father        Social History     Socioeconomic History   • Marital status:    Tobacco Use   • Smoking status: Current Every Day Smoker     Packs/day: 1.00     Years: 15.00     Pack years: 15.00     Types: Cigarettes   • Smokeless tobacco: Never Used   Vaping Use   • Vaping Use: Never used   Substance and Sexual Activity   • Alcohol use: Not Currently   • Drug use: No   • Sexual activity: Yes     Partners: Male     Birth control/protection: None           Objective   Physical Exam  Vitals and nursing note  reviewed.   Constitutional:       General: She is not in acute distress.     Appearance: She is well-developed. She is obese. She is not toxic-appearing.   HENT:      Head: Normocephalic.   Cardiovascular:      Rate and Rhythm: Normal rate and regular rhythm.      Pulses:           Radial pulses are 2+ on the right side and 2+ on the left side.      Comments: Borderline tachycardia.  Pulmonary:      Effort: Pulmonary effort is normal. No tachypnea or respiratory distress.      Breath sounds: Normal breath sounds. No decreased breath sounds.   Abdominal:      Palpations: Abdomen is soft.      Tenderness: There is no abdominal tenderness. There is no guarding.   Musculoskeletal:      Right lower leg: No tenderness. No edema.      Left lower leg: No tenderness. No edema.      Comments: Negative clinical DVT exam   Skin:     General: Skin is warm and dry.   Neurological:      Mental Status: She is alert and oriented to person, place, and time.   Psychiatric:         Mood and Affect: Mood normal.         Behavior: Behavior normal.         Procedures           ED Course  ED Course as of 08/17/22 0029   Tue Aug 16, 2022   2246 D-Dimer, Quant: <0.27 [RS]   2246 Troponin T: <0.010 [RS]   2246 proBNP: 12.1 [RS]   2246 Patient with negative evaluation here in the ER.  Symptoms most consistent with symptoms in the post-COVID phase.  Low risk for acute coronary syndrome.  Will discharge patient to follow-up with gastroenterology secondary to reported bright red blood occasionally in stool. I had a discussion with the patient/family regarding diagnosis, diagnostic results, treatment plan, and medications.  The patient/family indicated understanding of these instructions.  I spent adequate time at the bedside proceeding discharge necessary to personally discuss the aftercare instructions, giving patient education, providing explanations of the results of our evaluations/findings, and my decision making to assure that the  patient/family understand the plan of care.  Time was allotted to answer questions at that time and throughout the ED course.  Emphasis was placed on timely follow-up after discharge.  I also discussed the potential for the development of an acute emergent condition requiring further evaluation, admission, or even surgical intervention. I discussed that we found nothing during the visit today indicating the need for further workup, admission, or the presence of an unstable medical condition.  I encouraged the patient to return to the emergency department immediately for ANY concerns, worsening, new complaints, or if symptoms persist and unable to seek follow-up in a timely fashion.  The patient/family expressed understanding and agreement with this plan.  [RS]      ED Course User Index  [RS] Prabhjot Carrizales MD                      HEART Score: 1                      MDM  Number of Diagnoses or Management Options  BRBPR (bright red blood per rectum)  Chest pain in adult  Class 3 severe obesity due to excess calories without serious comorbidity with body mass index (BMI) of 50.0 to 59.9 in adult (HCC)  Post covid-19 condition, unspecified  Diagnosis management comments: Recent Results (from the past 24 hour(s))  -ECG 12 Lead:   Collection Time: 08/16/22  8:28 PM       Result                      Value             Ref Range           QT Interval                 330               ms                  QTC Interval                430               ms             -Comprehensive Metabolic Panel:   Collection Time: 08/16/22  8:33 PM  Specimen: Blood       Result                      Value             Ref Range           Glucose                     152 (H)           65 - 99 mg/dL       BUN                         9                 6 - 20 mg/dL        Creatinine                  0.82              0.57 - 1.00 *       Sodium                      141               136 - 145 mm*       Potassium                   4.7                3.5 - 5.2 mm*       Chloride                    103               98 - 107 mmo*       CO2                         27.0              22.0 - 29.0 *       Calcium                     10.0              8.6 - 10.5 m*       Total Protein               8.0               6.0 - 8.5 g/*       Albumin                     4.50              3.50 - 5.20 *       ALT (SGPT)                  14                1 - 33 U/L          AST (SGOT)                  21                1 - 32 U/L          Alkaline Phosphatase        74                39 - 117 U/L        Total Bilirubin             0.3               0.0 - 1.2 mg*       Globulin                    3.5               gm/dL               A/G Ratio                   1.3               g/dL                BUN/Creatinine Ratio        11.0              7.0 - 25.0          Anion Gap                   11.0              5.0 - 15.0 m*       eGFR                        98.8              >60.0 mL/min*  -Green Top (Gel):   Collection Time: 08/16/22  8:33 PM       Result                      Value             Ref Range           Extra Tube                                                    Hold for add-ons.  -Lavender Top:   Collection Time: 08/16/22  8:33 PM       Result                      Value             Ref Range           Extra Tube                                                    hold for add-on  -Gold Top - SST:   Collection Time: 08/16/22  8:33 PM       Result                      Value             Ref Range           Extra Tube                                                    Hold for add-ons.  -Light Blue Top:   Collection Time: 08/16/22  8:33 PM       Result                      Value             Ref Range           Extra Tube                                                    Hold for add-ons.  -CBC Auto Differential:   Collection Time: 08/16/22  8:33 PM  Specimen: Blood       Result                      Value             Ref Range           WBC                          11.65 (H)         3.40 - 10.80*       RBC                         5.08              3.77 - 5.28 *       Hemoglobin                  15.3              12.0 - 15.9 *       Hematocrit                  45.7              34.0 - 46.6 %       MCV                         90.0              79.0 - 97.0 *       MCH                         30.1              26.6 - 33.0 *       MCHC                        33.5              31.5 - 35.7 *       RDW                         12.8              12.3 - 15.4 %       RDW-SD                      42.1              37.0 - 54.0 *       MPV                         10.2              6.0 - 12.0 fL       Platelets                   266               140 - 450 10*       Neutrophil %                55.4              42.7 - 76.0 %       Lymphocyte %                36.5              19.6 - 45.3 %       Monocyte %                  6.5               5.0 - 12.0 %        Eosinophil %                1.0               0.3 - 6.2 %         Basophil %                  0.2               0.0 - 1.5 %         Immature Grans %            0.4               0.0 - 0.5 %         Neutrophils, Absolute       6.45              1.70 - 7.00 *       Lymphocytes, Absolute       4.25 (H)          0.70 - 3.10 *       Monocytes, Absolute         0.76              0.10 - 0.90 *       Eosinophils, Absolute       0.12              0.00 - 0.40 *       Basophils, Absolute         0.02              0.00 - 0.20 *       Immature Grans, Absolu*     0.05              0.00 - 0.05 *       nRBC                        0.0               0.0 - 0.2 /1*  -BNP:   Collection Time: 08/16/22  8:33 PM  Specimen: Blood       Result                      Value             Ref Range           proBNP                      12.1              0.0 - 450.0 *  -Troponin:   Collection Time: 08/16/22  8:33 PM  Specimen: Blood       Result                      Value             Ref Range           Troponin T                  <0.010            0.000 -  0.03*  -D-dimer, Quantitative:   Collection Time: 08/16/22  8:33 PM  Specimen: Blood       Result                      Value             Ref Range           D-Dimer, Quantitative       <0.27             0.01 - 0.50 *  Note: In addition to lab results from this visit, the labs listed above may include labs taken at another facility or during a different encounter within the last 24 hours. Please correlate lab times with ED admission and discharge times for further clarification of the services performed during this visit.    No orders to display  -----------------------------------------------------            08/16/22 08/16/22 08/16/22 08/16/22 2036 2123 [id number] [id number]     -----------------------------------------------------   BP:                 131/74              136/80     BP Location:                                           Patient Position:                                           Pulse:                          95                 Resp:       18                                     Temp:                                              TempSrc:                                           SpO2:                98%       96%                 Weight:                                            Height:                                           -----------------------------------------------------  Medications  sodium chloride 0.9 % flush 10 mL (has no administration in time range)  aluminum-magnesium hydroxide-simethicone (MAALOX MAX) 400-400-40 MG/5ML suspension 15 mL (15 mL Oral Not Given 8/16/22 2156)  Lidocaine Viscous HCl (XYLOCAINE) 2 % solution 15 mL (15 mL Mouth/Throat Not Given 8/16/22 2156)  pantoprazole (PROTONIX) injection 40 mg (40 mg Intravenous Given 8/16/22 2230)  ECG/EMG Results (last 24 hours)     Procedure Component Value Units Date/Time    ECG 12 Lead ([id number]) Collected: 08/16/22 2028      Updated: 08/16/22 2133     QT Interval 330 ms      QTC Interval 430 ms     Narrative:      Test Reason : CP  Blood Pressure :   */*   mmHG  Vent. Rate : 102 BPM     Atrial Rate : 102 BPM     P-R Int : 146 ms          QRS Dur :  70 ms      QT Int : 330 ms       P-R-T Axes :  45  51  57 degrees     QTc Int : 430 ms    Sinus tachycardia  Otherwise normal ECG  When compared with ECG of 16-JUL-2022 06:10,  No significant change was found  Confirmed by TOMAS GURROLA MD ([id number]) on 8/16/2022 9:33:48 PM    Referred By:            Confirmed By: TOMAS GURROLA MD      ECG 12 Lead   Final Result    Test Reason : CP    Blood Pressure :   */*   mmHG    Vent. Rate : 102 BPM     Atrial Rate : 102 BPM       P-R Int : 146 ms          QRS Dur :  70 ms        QT Int : 330 ms       P-R-T Axes :  45  51  57 degrees       QTc Int : 430 ms        Sinus tachycardia    Otherwise normal ECG    When compared with ECG of 16-JUL-2022 06:10,    No significant change was found    Confirmed by TOMAS GURROLA MD ([id number]) on 8/16/2022 9:33:48 PM        Referred By:            Confirmed By: TOMAS GURROLA MD            Amount and/or Complexity of Data Reviewed  Clinical lab tests: reviewed        Final diagnoses:   Chest pain in adult   Post covid-19 condition, unspecified   BRBPR (bright red blood per rectum)   Class 3 severe obesity due to excess calories without serious comorbidity with body mass index (BMI) of 50.0 to 59.9 in adult (HCC)       ED Disposition  ED Disposition     ED Disposition   Discharge    Condition   Stable    Comment   --             Marleny Mathis MD  [address] [facility]  [address]  [place] [address]  [phone]    Schedule an appointment as soon as possible for a visit       [facility]  [address] [facility] [facility] [address] [phone]    As needed, If symptoms worsen or ANY concerns.    Lyle Michael MD  [address]  [facility]  [address]  [phone]    Schedule an appointment as soon as possible for a visit            Medication List      No changes were made to your prescriptions during this visit.          Prabhjot Carrizales MD  08/17/22 0029

## 2022-08-18 ENCOUNTER — HOSPITAL ENCOUNTER (OUTPATIENT)
Dept: GENERAL RADIOLOGY | Facility: HOSPITAL | Age: 32
Discharge: HOME | End: 2022-08-18

## 2022-08-18 ENCOUNTER — OFFICE VISIT (OUTPATIENT)
Dept: INTERNAL MEDICINE | Facility: CLINIC | Age: 32
End: 2022-08-18

## 2022-08-18 ENCOUNTER — LAB (OUTPATIENT)
Dept: LAB | Facility: HOSPITAL | Age: 32
End: 2022-08-18

## 2022-08-18 VITALS
TEMPERATURE: 97.1 F | HEART RATE: 74 BPM | OXYGEN SATURATION: 98 % | SYSTOLIC BLOOD PRESSURE: 132 MMHG | DIASTOLIC BLOOD PRESSURE: 74 MMHG | WEIGHT: 293 LBS | BODY MASS INDEX: 44.41 KG/M2 | RESPIRATION RATE: 20 BRPM | HEIGHT: 68 IN

## 2022-08-18 DIAGNOSIS — Z13.29 SCREENING FOR ENDOCRINE DISORDER: ICD-10-CM

## 2022-08-18 DIAGNOSIS — K21.9 GASTROESOPHAGEAL REFLUX DISEASE WITHOUT ESOPHAGITIS: Primary | ICD-10-CM

## 2022-08-18 DIAGNOSIS — E55.9 VITAMIN D DEFICIENCY: ICD-10-CM

## 2022-08-18 DIAGNOSIS — K62.5 BRBPR (BRIGHT RED BLOOD PER RECTUM): ICD-10-CM

## 2022-08-18 DIAGNOSIS — R73.09 ABNORMAL GLUCOSE: ICD-10-CM

## 2022-08-18 DIAGNOSIS — J40 BRONCHITIS: ICD-10-CM

## 2022-08-18 DIAGNOSIS — Z13.21 ENCOUNTER FOR VITAMIN DEFICIENCY SCREENING: ICD-10-CM

## 2022-08-18 DIAGNOSIS — H02.66 XANTHELASMA OF EYELID, BILATERAL: ICD-10-CM

## 2022-08-18 DIAGNOSIS — H02.63 XANTHELASMA OF EYELID, BILATERAL: ICD-10-CM

## 2022-08-18 DIAGNOSIS — Z13.220 SCREENING, LIPID: ICD-10-CM

## 2022-08-18 PROBLEM — H02.60 XANTHELASMA: Status: ACTIVE | Noted: 2022-08-18

## 2022-08-18 LAB — HBA1C MFR BLD: 7.4 % (ref 4.8–5.6)

## 2022-08-18 PROCEDURE — 82607 VITAMIN B-12: CPT | Performed by: PHYSICIAN ASSISTANT

## 2022-08-18 PROCEDURE — 99214 OFFICE O/P EST MOD 30 MIN: CPT | Performed by: PHYSICIAN ASSISTANT

## 2022-08-18 PROCEDURE — 82306 VITAMIN D 25 HYDROXY: CPT | Performed by: PHYSICIAN ASSISTANT

## 2022-08-18 PROCEDURE — 71046 X-RAY EXAM CHEST 2 VIEWS: CPT

## 2022-08-18 PROCEDURE — 80061 LIPID PANEL: CPT | Performed by: PHYSICIAN ASSISTANT

## 2022-08-18 PROCEDURE — 84443 ASSAY THYROID STIM HORMONE: CPT | Performed by: PHYSICIAN ASSISTANT

## 2022-08-18 PROCEDURE — 83036 HEMOGLOBIN GLYCOSYLATED A1C: CPT | Performed by: PHYSICIAN ASSISTANT

## 2022-08-18 RX ORDER — PANTOPRAZOLE SODIUM 40 MG/1
40 TABLET, DELAYED RELEASE ORAL DAILY
Qty: 30 TABLET | Refills: 2 | Status: SHIPPED | OUTPATIENT
Start: 2022-08-18 | End: 2022-08-30 | Stop reason: SINTOL

## 2022-08-18 RX ORDER — ALBUTEROL SULFATE 90 UG/1
2 AEROSOL, METERED RESPIRATORY (INHALATION) EVERY 6 HOURS PRN
Qty: 18 G | Refills: 0 | Status: SHIPPED | OUTPATIENT
Start: 2022-08-18

## 2022-08-18 RX ORDER — AZITHROMYCIN 250 MG/1
TABLET, FILM COATED ORAL
Qty: 6 TABLET | Refills: 0 | Status: SHIPPED | OUTPATIENT
Start: 2022-08-18 | End: 2022-08-26

## 2022-08-18 RX ORDER — PREDNISONE 20 MG/1
20 TABLET ORAL DAILY
Qty: 5 TABLET | Refills: 0 | Status: SHIPPED | OUTPATIENT
Start: 2022-08-18 | End: 2022-09-19

## 2022-08-18 RX ORDER — OMEPRAZOLE 20 MG/1
20 CAPSULE, DELAYED RELEASE ORAL DAILY
COMMUNITY
End: 2022-08-18

## 2022-08-18 NOTE — PROGRESS NOTES
Chief Complaint  spot under eye, Hyperlipidemia (Labs including cholesterol, b12 and vit d ), and Anxiety    Subjective          History of Present Illness  Geeta Arteaga presents to [facility] for   GERD:  Has been having chest pains for the last few weeks in the middle of her chest, she went to the ER for it a few days ago and they thought it was related to GERD, she had a normal heart work up. Has not had any SOA. Stopped drinking pop, has been eating healthy foods to help her stomach. Has been eating small meals 3 times a day instead of one large meal.     BRBPR:  Has been having blood in her stools for the last few weeks, thinks it is a hemorrhoid, is using the hemorrhoid cream and that is helping. She is not having constipation. Sx have been going on since she got Covid 3 weeks ago.     Bronchitis:  She has had a bad cough and wheeze since she had covid 3 weeks ago. No fever in the last week. Has been using albuterol inhaler but it isn't helping much. No hx of asthma or COPD, uses alb prn for cough related to bronchitis.     Skin lesion:  Has spots near her eyes that she read was associated with elevated cholesterol so she would like her cholesterol checked.       Review of Systems   Constitutional: Negative for fever and unexpected weight loss.   HENT: Negative for congestion and rhinorrhea.    Respiratory: Positive for cough and wheezing. Negative for shortness of breath.    Cardiovascular: Negative for chest pain and palpitations.   Gastrointestinal: Positive for abdominal pain, blood in stool and GERD.       The following portions of the patient's history were reviewed and updated as appropriate: allergies, current medications, past family history, past medical history, past social history, past surgical history and problem list.  Allergies   Allergen Reactions   • Sertraline Irritability     Suicidal thoughts     Current Outpatient Medications on File Prior to Visit  "  Medication Sig Dispense Refill   • Hydrocortisone, Perianal, (Anusol-HC) 2.5 % rectal cream Insert  into the rectum 2 (Two) Times a Day. 28 g 0   • hydrOXYzine pamoate (Vistaril) 25 MG capsule Take 1 capsule by mouth At Night As Needed for Anxiety (sleep). 30 capsule 1   • Melatonin 1 MG chewable tablet Chew 20 mg Every Night.       No current facility-administered medications on file prior to visit.     New Medications Ordered This Visit   Medications   • pantoprazole (Protonix) 40 MG EC tablet     Sig: Take 1 tablet by mouth Daily.     Dispense:  30 tablet     Refill:  2   • azithromycin (Zithromax Z-Edward) 250 MG tablet     Sig: Take 2 tablets by mouth on day 1, then 1 tablet daily on days 2-5     Dispense:  6 tablet     Refill:  0   • albuterol sulfate  (90 Base) MCG/ACT inhaler     Sig: Inhale 2 puffs Every 6 (Six) Hours As Needed for Wheezing.     Dispense:  18 g     Refill:  0   • predniSONE (DELTASONE) 20 MG tablet     Sig: Take 1 tablet by mouth Daily.     Dispense:  5 tablet     Refill:  0     Social History     Tobacco Use   Smoking Status Current Every Day Smoker   • Packs/day: 1.00   • Years: 15.00   • Pack years: 15.00   • Types: Cigarettes   Smokeless Tobacco Never Used        Objective   Vital Signs:   Vitals:    08/18/22 1135   BP: 132/74   Pulse: 74   Resp: 20   Temp: 97.1 °F (36.2 °C)   TempSrc: Temporal   SpO2: 98%   Weight: (!) 147 kg (324 lb)   Height: 172.7 cm (68\")   PainSc: 0-No pain      Physical Exam  Vitals reviewed.   Constitutional:       General: She is not in acute distress.     Appearance: Normal appearance.   HENT:      Head: Normocephalic and atraumatic.   Eyes:      General: No scleral icterus.     Extraocular Movements: Extraocular movements intact.      Conjunctiva/sclera: Conjunctivae normal.   Cardiovascular:      Rate and Rhythm: Normal rate and regular rhythm.      Heart sounds: Normal heart sounds. No murmur heard.  Pulmonary:      Effort: Pulmonary effort is normal. " No respiratory distress.      Breath sounds: Normal breath sounds. No stridor. No wheezing or rhonchi.   Musculoskeletal:      Cervical back: Normal range of motion and neck supple.   Skin:     General: Skin is warm and dry.      Coloration: Skin is not jaundiced.   Neurological:      General: No focal deficit present.      Mental Status: She is alert and oriented to person, place, and time.      Gait: Gait normal.   Psychiatric:         Mood and Affect: Mood normal.         Behavior: Behavior normal.        Patient's last menstrual period was 08/16/2022 (approximate).    Result Review :                   Assessment and Plan    Diagnoses and all orders for this visit:    1. Gastroesophageal reflux disease without esophagitis (Primary)  Assessment & Plan:  Chronic, worsening, start Protonix, refer to GI    Orders:  -     Ambulatory Referral to Gastroenterology  -     pantoprazole (Protonix) 40 MG EC tablet; Take 1 tablet by mouth Daily.  Dispense: 30 tablet; Refill: 2    2. BRBPR (bright red blood per rectum)  Assessment & Plan:  Refer to GI    Orders:  -     Ambulatory Referral to Gastroenterology  -     Vitamin B12; Future    3. Bronchitis  Assessment & Plan:  tx w/zpack and steroids, f/u if sx persist or worsen or has new sx such as fevers.  Albuterol as needed, get chest xray    Orders:  -     XR Chest PA & Lateral; Future  -     azithromycin (Zithromax Z-Edward) 250 MG tablet; Take 2 tablets by mouth on day 1, then 1 tablet daily on days 2-5  Dispense: 6 tablet; Refill: 0  -     albuterol sulfate  (90 Base) MCG/ACT inhaler; Inhale 2 puffs Every 6 (Six) Hours As Needed for Wheezing.  Dispense: 18 g; Refill: 0  -     predniSONE (DELTASONE) 20 MG tablet; Take 1 tablet by mouth Daily.  Dispense: 5 tablet; Refill: 0    4. Xanthelasma of eyelid, bilateral  Assessment & Plan:  Check lipids    Orders:  -     Lipid Panel; Future    5. Screening, lipid  -     Lipid Panel; Future    6. Abnormal glucose  -      Hemoglobin A1c; Future    7. Encounter for vitamin deficiency screening    8. Vitamin D deficiency  -     Vitamin D 25 Hydroxy; Future    9. Screening for endocrine disorder  -     TSH Rfx On Abnormal To Free T4; Future      Follow Up   Return if symptoms worsen or fail to improve.    Follow up if symptoms worsen or persist or has new or concerning symptoms, go to ER for severe symptoms.   Reviewed common medication effects and side effects and advised to report side effects immediately.  Encouraged medication compliance and the importance of keeping scheduled follow up appointments with me and any other providers.  If a referral was made please contact our office if you have not heard about an appointment in the next 2 weeks.   If labs or images are ordered we will contact you with the results within the next week.  If you have not heard from us after a week please call our office to inquire about the results.   Patient was given instructions and counseling regarding her condition or for health maintenance advice. Please see specific information pulled into the AVS if appropriate.     Radha Nicole PA-C    * Please note that portions of this note were completed with a voice recognition program.

## 2022-08-19 ENCOUNTER — TELEPHONE (OUTPATIENT)
Dept: INTERNAL MEDICINE | Facility: CLINIC | Age: 32
End: 2022-08-19

## 2022-08-19 DIAGNOSIS — E11.9 TYPE 2 DIABETES MELLITUS WITHOUT COMPLICATION, WITHOUT LONG-TERM CURRENT USE OF INSULIN: Primary | ICD-10-CM

## 2022-08-19 LAB
25(OH)D3 SERPL-MCNC: 21.2 NG/ML (ref 30–100)
CHOLEST SERPL-MCNC: 149 MG/DL (ref 0–200)
HDLC SERPL-MCNC: 35 MG/DL (ref 40–60)
LDLC SERPL CALC-MCNC: 79 MG/DL (ref 0–100)
LDLC/HDLC SERPL: 2.06 {RATIO}
TRIGL SERPL-MCNC: 209 MG/DL (ref 0–150)
TSH SERPL DL<=0.05 MIU/L-ACNC: 1.72 UIU/ML (ref 0.27–4.2)
VIT B12 BLD-MCNC: 303 PG/ML (ref 211–946)
VLDLC SERPL-MCNC: 35 MG/DL (ref 5–40)

## 2022-08-19 RX ORDER — GLUCOSAMINE HCL/CHONDROITIN SU 500-400 MG
CAPSULE ORAL
Qty: 100 EACH | Refills: 1 | Status: SHIPPED | OUTPATIENT
Start: 2022-08-19 | End: 2022-09-19 | Stop reason: SDUPTHER

## 2022-08-19 RX ORDER — BLOOD-GLUCOSE METER
KIT MISCELLANEOUS
Qty: 1 EACH | Refills: 0 | Status: SHIPPED | OUTPATIENT
Start: 2022-08-19

## 2022-08-19 RX ORDER — SYRING-NEEDL,DISP,INSUL,0.3 ML 30 GX5/16"
SYRINGE, EMPTY DISPOSABLE MISCELLANEOUS
Qty: 100 EACH | Refills: 1 | Status: SHIPPED | OUTPATIENT
Start: 2022-08-19 | End: 2022-09-19 | Stop reason: SDUPTHER

## 2022-08-19 RX ORDER — CHOLECALCIFEROL (VITAMIN D3) 50 MCG
2000 TABLET ORAL DAILY
Qty: 90 TABLET | Refills: 1 | Status: SHIPPED | OUTPATIENT
Start: 2022-08-19 | End: 2022-11-21

## 2022-08-19 NOTE — TELEPHONE ENCOUNTER
----- Message from Radha Nicole PA-C sent at 8/19/2022 10:18 AM EDT -----  Called pt to review labs, no answer.   Please let her know her labs showed the start of diabetes. I will send in metformin (a pill for diabetes) and a glucometer and she can check her sugar in the mornings fasting a only few times a week, write these numbers down and bring to a f/u appt in a month (please have her sched). I will refer her to a diabetic educator as well. Diet changes will be helpful, she should limit carbs (sugars, bread, pasta) and increase fiber (fruits/vegi).   Labs also showed low vit D so I will send in a daily vit D supplement.   Her triglycerides (a type of chol) was elevated. Improving her diet and treating the diabetes will help this.

## 2022-08-19 NOTE — TELEPHONE ENCOUNTER
Patient states that she received her test results from yesterday and she is a little worried and would like to get a call back as soon as possible in regards to those results.    Please advise and call the patient back at [phone].

## 2022-08-19 NOTE — TELEPHONE ENCOUNTER
S/W pt to inform her of of lab results. Advised her on dietary changes. Answered questions patient had regarding medication.

## 2022-08-21 NOTE — ASSESSMENT & PLAN NOTE
tx w/zpack and steroids, f/u if sx persist or worsen or has new sx such as fevers.  Albuterol as needed, get chest xray

## 2022-08-26 ENCOUNTER — OFFICE VISIT (OUTPATIENT)
Dept: INTERNAL MEDICINE | Facility: CLINIC | Age: 32
End: 2022-08-26

## 2022-08-26 VITALS
RESPIRATION RATE: 20 BRPM | WEIGHT: 293 LBS | HEART RATE: 101 BPM | BODY MASS INDEX: 44.41 KG/M2 | TEMPERATURE: 97.1 F | HEIGHT: 68 IN | SYSTOLIC BLOOD PRESSURE: 138 MMHG | DIASTOLIC BLOOD PRESSURE: 78 MMHG | OXYGEN SATURATION: 99 %

## 2022-08-26 DIAGNOSIS — I10 PRIMARY HYPERTENSION: Primary | ICD-10-CM

## 2022-08-26 PROCEDURE — 99214 OFFICE O/P EST MOD 30 MIN: CPT | Performed by: NURSE PRACTITIONER

## 2022-08-26 RX ORDER — LOSARTAN POTASSIUM 50 MG/1
50 TABLET ORAL DAILY
Qty: 30 TABLET | Refills: 2 | OUTPATIENT
Start: 2022-08-26 | End: 2022-08-30

## 2022-08-26 NOTE — PROGRESS NOTES
Subjective   Geeta Arteaga is a 32 y.o. female    Chief Complaint   Patient presents with   • Hypertension     Bp has been running high, went to [facility] ER 8/22 or 8/23, waiting on ER visit, running 160s/80-90.      History of Present Illness     Pt states that her BP has been running high for the past few weeks.  She had COVID around 7/22/22 and since then she feels that her BP has been running high.  She was seen in the ER at [facility] last week and BP was 160's/90's.  Denies CP, SOA or any other associated sx's.    The following portions of the patient's history were reviewed and updated as appropriate: allergies, current medications, past family history, past medical history, past social history, past surgical history and problem list.    Current Outpatient Medications:   •  albuterol sulfate  (90 Base) MCG/ACT inhaler, Inhale 2 puffs Every 6 (Six) Hours As Needed for Wheezing., Disp: 18 g, Rfl: 0  •  Cholecalciferol (Vitamin D) 50 MCG (2000 UT) tablet, Take 2,000 Units by mouth Daily., Disp: 90 tablet, Rfl: 1  •  Glucose Blood (Blood Glucose Test) strip, Use with glucometer daily as directed, E11.9, Disp: 100 each, Rfl: 1  •  glucose monitor monitoring kit, Use as directed, E11.9, Disp: 1 each, Rfl: 0  •  Hydrocortisone, Perianal, (Anusol-HC) 2.5 % rectal cream, Insert  into the rectum 2 (Two) Times a Day., Disp: 28 g, Rfl: 0  •  hydrOXYzine pamoate (Vistaril) 25 MG capsule, Take 1 capsule by mouth At Night As Needed for Anxiety (sleep)., Disp: 30 capsule, Rfl: 1  •  Lancet Device misc, Use with glucometer daily as directed, E11.9, Disp: 100 each, Rfl: 1  •  Melatonin 1 MG chewable tablet, Chew 20 mg Every Night., Disp: , Rfl:   •  metFORMIN (Glucophage) 500 MG tablet, Take 1 tablet by mouth Daily With Breakfast., Disp: 30 tablet, Rfl: 2  •  pantoprazole (Protonix) 40 MG EC tablet, Take 1 tablet by mouth Daily., Disp: 30 tablet, Rfl: 2  •  losartan (Cozaar) 50 MG tablet, Take 1 tablet by  "mouth Daily., Disp: 30 tablet, Rfl: 2  •  predniSONE (DELTASONE) 20 MG tablet, Take 1 tablet by mouth Daily., Disp: 5 tablet, Rfl: 0     Review of Systems   Constitutional: Negative for chills, fatigue and fever.   Respiratory: Negative for cough, chest tightness and shortness of breath.    Cardiovascular: Negative for chest pain.   Gastrointestinal: Negative for abdominal pain, diarrhea, nausea and vomiting.   Endocrine: Negative for cold intolerance and heat intolerance.   Musculoskeletal: Negative for arthralgias.   Neurological: Negative for dizziness.       Objective   Physical Exam  Constitutional:       Appearance: She is well-developed.   HENT:      Head: Normocephalic and atraumatic.   Eyes:      Conjunctiva/sclera: Conjunctivae normal.      Pupils: Pupils are equal, round, and reactive to light.   Cardiovascular:      Rate and Rhythm: Normal rate and regular rhythm.      Heart sounds: Normal heart sounds.   Pulmonary:      Effort: Pulmonary effort is normal.      Breath sounds: Normal breath sounds.   Abdominal:      General: Bowel sounds are normal.      Palpations: Abdomen is soft.   Musculoskeletal:         General: Normal range of motion.      Cervical back: Normal range of motion.   Skin:     General: Skin is warm and dry.   Neurological:      Mental Status: She is alert and oriented to person, place, and time.      Deep Tendon Reflexes: Reflexes are normal and symmetric.   Psychiatric:         Behavior: Behavior normal.         Thought Content: Thought content normal.         Judgment: Judgment normal.       Vitals:    08/26/22 1146   BP: 138/78   Cuff Size: Large Adult   Pulse: 101   Resp: 20   Temp: 97.1 °F (36.2 °C)   TempSrc: Infrared   SpO2: 99%   Weight: (!) 148 kg (326 lb)   Height: 172.7 cm (68\")         Assessment & Plan   Diagnoses and all orders for this visit:    1. Primary hypertension (Primary)  -     losartan (Cozaar) 50 MG tablet; Take 1 tablet by mouth Daily.  Dispense: 30 tablet; " Refill: 2      We will start Losartan 50 mg daily  Keep BP log if possible  Low sodium diet, increase exercise to 150 min/week  Return for Next scheduled follow up.

## 2022-08-27 ENCOUNTER — PATIENT MESSAGE (OUTPATIENT)
Dept: INTERNAL MEDICINE | Facility: CLINIC | Age: 32
End: 2022-08-27

## 2022-08-29 ENCOUNTER — TELEPHONE (OUTPATIENT)
Dept: INTERNAL MEDICINE | Facility: CLINIC | Age: 32
End: 2022-08-29

## 2022-08-29 ENCOUNTER — HOSPITAL ENCOUNTER (OUTPATIENT)
Dept: DIABETES SERVICES | Facility: HOSPITAL | Age: 32
Setting detail: RECURRING SERIES
Discharge: HOME | End: 2022-08-29

## 2022-08-29 DIAGNOSIS — F41.9 ANXIETY: Primary | ICD-10-CM

## 2022-08-29 DIAGNOSIS — F41.0 PANIC: ICD-10-CM

## 2022-08-29 PROCEDURE — G0108 DIAB MANAGE TRN  PER INDIV: HCPCS

## 2022-08-29 PROCEDURE — 99283 EMERGENCY DEPT VISIT LOW MDM: CPT

## 2022-08-29 PROCEDURE — 93005 ELECTROCARDIOGRAM TRACING: CPT

## 2022-08-29 PROCEDURE — 96372 THER/PROPH/DIAG INJ SC/IM: CPT

## 2022-08-29 PROCEDURE — 93005 ELECTROCARDIOGRAM TRACING: CPT | Performed by: STUDENT IN AN ORGANIZED HEALTH CARE EDUCATION/TRAINING PROGRAM

## 2022-08-29 NOTE — CONSULTS
Diabetes Education    Patient Name:  Geeta Arteaga  YOB: 1990  MRN: [id number]  Admit Date:  8/29/2022      Patient attended the scheduled 2 hour diabetes education class. Please see media tab for assessment and notes if you use EPIC. If you are not an EPIC user a copy of patient's assessment and notes will be sent per routine. Thank you.       Electronically signed by:  Renée Miller RN  08/29/22 15:09 EDT

## 2022-08-30 ENCOUNTER — HOSPITAL ENCOUNTER (EMERGENCY)
Facility: HOSPITAL | Age: 32
Discharge: HOME | End: 2022-08-30
Attending: STUDENT IN AN ORGANIZED HEALTH CARE EDUCATION/TRAINING PROGRAM | Admitting: STUDENT IN AN ORGANIZED HEALTH CARE EDUCATION/TRAINING PROGRAM

## 2022-08-30 ENCOUNTER — TELEPHONE (OUTPATIENT)
Dept: INTERNAL MEDICINE | Facility: CLINIC | Age: 32
End: 2022-08-30

## 2022-08-30 VITALS
HEIGHT: 68 IN | TEMPERATURE: 97.9 F | DIASTOLIC BLOOD PRESSURE: 90 MMHG | SYSTOLIC BLOOD PRESSURE: 131 MMHG | OXYGEN SATURATION: 98 % | WEIGHT: 293 LBS | HEART RATE: 98 BPM | BODY MASS INDEX: 44.41 KG/M2 | RESPIRATION RATE: 18 BRPM

## 2022-08-30 DIAGNOSIS — Z86.39 HISTORY OF DIABETES MELLITUS: ICD-10-CM

## 2022-08-30 DIAGNOSIS — Z87.42 HISTORY OF PCOS: ICD-10-CM

## 2022-08-30 DIAGNOSIS — R20.8 BURNING SENSATION OF SKIN: ICD-10-CM

## 2022-08-30 DIAGNOSIS — T50.905A ADVERSE EFFECT OF DRUG, INITIAL ENCOUNTER: Primary | ICD-10-CM

## 2022-08-30 DIAGNOSIS — L23.9 ALLERGIC DERMATITIS: ICD-10-CM

## 2022-08-30 LAB
GLUCOSE BLDC GLUCOMTR-MCNC: 90 MG/DL (ref 70–130)
QT INTERVAL: 346 MS
QTC INTERVAL: 441 MS

## 2022-08-30 PROCEDURE — 82962 GLUCOSE BLOOD TEST: CPT

## 2022-08-30 PROCEDURE — 96372 THER/PROPH/DIAG INJ SC/IM: CPT

## 2022-08-30 PROCEDURE — 25010000002 DEXAMETHASONE SODIUM PHOSPHATE 10 MG/ML SOLUTION: Performed by: PHYSICIAN ASSISTANT

## 2022-08-30 RX ORDER — FAMOTIDINE 20 MG/1
20 TABLET, FILM COATED ORAL ONCE
Status: COMPLETED | OUTPATIENT
Start: 2022-08-30 | End: 2022-08-30

## 2022-08-30 RX ORDER — DEXAMETHASONE SODIUM PHOSPHATE 10 MG/ML
10 INJECTION INTRAMUSCULAR; INTRAVENOUS ONCE
Status: COMPLETED | OUTPATIENT
Start: 2022-08-30 | End: 2022-08-30

## 2022-08-30 RX ORDER — FAMOTIDINE 20 MG/1
20 TABLET, FILM COATED ORAL 2 TIMES DAILY
Qty: 14 TABLET | Refills: 0 | Status: SHIPPED | OUTPATIENT
Start: 2022-08-30 | End: 2022-09-19 | Stop reason: SDUPTHER

## 2022-08-30 RX ADMIN — FAMOTIDINE 20 MG: 20 TABLET ORAL at 02:15

## 2022-08-30 RX ADMIN — DEXAMETHASONE SODIUM PHOSPHATE 10 MG: 10 INJECTION INTRAMUSCULAR; INTRAVENOUS at 02:15

## 2022-08-30 NOTE — TELEPHONE ENCOUNTER
Pt called and stated she went to the ER. She states she had a reaction to Losartan, after switching to 1/2 tablet as advised yesterday.     Reaction to Losartan - neck pain, insides were burning, dizzy, lethargic, skin turned red / hives.     Reaction to steroid - dizziness / rapid chest palpitations.     Pt advised to stop taking medication at ER visit. I reiterated this.     Please advise on what she can do / a change in medication?

## 2022-08-30 NOTE — TELEPHONE ENCOUNTER
From: Geeta Arteaga  To: Marleny Mathis MD  Sent: 8/27/2022 8:02 PM EDT  Subject: Hi    So I was recently tested and I learned I have diabetes and high blood pressure on top of all that I have the anxiety attacks and stress is through the roof ...... The vistaril helps just a little I need something to help me I'm begging you to help figure out what's wrong with me I need to reduce my stress levels and anxiety it's so bad I can't function I can't work I can't even leave my house plz help me figure this out

## 2022-08-30 NOTE — TELEPHONE ENCOUNTER
Please sched an ER f/u visit to discuss, she told the ER she doesn't have HTN so they didn't add another medication. Her blood pressures were ok the last 2 visits with us when she wasn't on BP med. Have her monitor her blood pressures over the next few days and bring with her to visit. She can also come in the office for a random blood pressure check.

## 2022-08-31 NOTE — TELEPHONE ENCOUNTER
ER visit has been scheduled. Patient communicated with Dr. Mathis via [employer] about BP and pulse checks.

## 2022-09-08 NOTE — TELEPHONE ENCOUNTER
[facility] requested one touch verio test strip #300     Pt uses 3 strips per day. Rx should only be for 100 since the 1 month supply is for 90. Pharmacist states they are automatically sent.

## 2022-09-19 ENCOUNTER — OFFICE VISIT (OUTPATIENT)
Dept: INTERNAL MEDICINE | Facility: CLINIC | Age: 32
End: 2022-09-19

## 2022-09-19 VITALS
HEIGHT: 68 IN | OXYGEN SATURATION: 98 % | HEART RATE: 74 BPM | BODY MASS INDEX: 44.41 KG/M2 | RESPIRATION RATE: 20 BRPM | TEMPERATURE: 97.9 F | SYSTOLIC BLOOD PRESSURE: 136 MMHG | DIASTOLIC BLOOD PRESSURE: 78 MMHG | WEIGHT: 293 LBS

## 2022-09-19 DIAGNOSIS — J40 BRONCHITIS: ICD-10-CM

## 2022-09-19 DIAGNOSIS — R10.11 RUQ PAIN: ICD-10-CM

## 2022-09-19 DIAGNOSIS — E11.9 TYPE 2 DIABETES MELLITUS WITHOUT COMPLICATION, WITHOUT LONG-TERM CURRENT USE OF INSULIN: Primary | ICD-10-CM

## 2022-09-19 DIAGNOSIS — E55.9 VITAMIN D DEFICIENCY: ICD-10-CM

## 2022-09-19 DIAGNOSIS — K21.9 GASTROESOPHAGEAL REFLUX DISEASE WITHOUT ESOPHAGITIS: ICD-10-CM

## 2022-09-19 PROCEDURE — 99214 OFFICE O/P EST MOD 30 MIN: CPT | Performed by: PHYSICIAN ASSISTANT

## 2022-09-19 RX ORDER — DOXYCYCLINE 100 MG/1
100 TABLET ORAL 2 TIMES DAILY
Qty: 14 TABLET | Refills: 0 | Status: SHIPPED | OUTPATIENT
Start: 2022-09-19 | End: 2022-09-26

## 2022-09-19 RX ORDER — FAMOTIDINE 20 MG/1
20 TABLET, FILM COATED ORAL 2 TIMES DAILY
Qty: 60 TABLET | Refills: 2 | Status: SHIPPED | OUTPATIENT
Start: 2022-09-19

## 2022-09-19 RX ORDER — GUAIFENESIN 600 MG/1
1200 TABLET, EXTENDED RELEASE ORAL 2 TIMES DAILY
Qty: 24 TABLET | Refills: 0 | Status: SHIPPED | OUTPATIENT
Start: 2022-09-19 | End: 2022-11-21

## 2022-09-19 RX ORDER — GLUCOSAMINE HCL/CHONDROITIN SU 500-400 MG
CAPSULE ORAL
Qty: 100 EACH | Refills: 11 | Status: SHIPPED | OUTPATIENT
Start: 2022-09-19

## 2022-09-19 RX ORDER — LANOLIN ALCOHOL/MO/W.PET/CERES
1000 CREAM (GRAM) TOPICAL DAILY
Qty: 30 TABLET | Refills: 2 | Status: SHIPPED | OUTPATIENT
Start: 2022-09-19 | End: 2022-11-21

## 2022-09-19 RX ORDER — SYRING-NEEDL,DISP,INSUL,0.3 ML 30 GX5/16"
SYRINGE, EMPTY DISPOSABLE MISCELLANEOUS
Qty: 100 EACH | Refills: 11 | Status: SHIPPED | OUTPATIENT
Start: 2022-09-19

## 2022-09-19 NOTE — PROGRESS NOTES
Chief Complaint  Diabetes    Subjective          History of Present Illness  Geeta Arteaga presents to [facility] for   History of Present Illness  DMII:  Newly dx, she did not start the Metformin, she made diet changes. She stopped drinking pop, was drinking 4L of pop per day. She has started drinking almond milk instead of regular milk. Drinking more water. She is eating healthier foods. Has lost wt since last visit.  Lab Results       Component                Value               Date                       HGBA1C                   7.40 (H)            08/18/2022              GERD/CP:  Has been having chest pains for the last few weeks in the middle of her chest that wraps around under her breast to her right side, she went to the ER for it last month and they thought it was related to GERD, she had a normal heart work up. Has not had any SOA. Occurs most often in the evenings. It did resolve while she was on protonix she thinks but she stopped it and sx returned. Has appt with GI tomorrow for episodes of blood in stool.     Bronchitis:  Has had mucous and a cough that started up again over the last few weeks. Has productive cough with green mucous that is getting worse. She was treated for bronchitis when she was here last month and sx improved but are back again. Has not had wheeze. Does not feel like the alb inhaler did much. Cxr didn't show pneumonia. Is still smoking.       Review of Systems   Constitutional: Negative for fever and unexpected weight loss.   Respiratory: Positive for cough. Negative for shortness of breath and wheezing.    Cardiovascular: Positive for chest pain. Negative for palpitations.   Gastrointestinal: Positive for abdominal pain. Negative for nausea and vomiting.       The following portions of the patient's history were reviewed and updated as appropriate: allergies, current medications, past family history, past medical history, past social  history, past surgical history and problem list.  Allergies   Allergen Reactions   • Sertraline Irritability     Suicidal thoughts   • Losartan Potassium Hives     Current Outpatient Medications on File Prior to Visit   Medication Sig Dispense Refill   • albuterol sulfate  (90 Base) MCG/ACT inhaler Inhale 2 puffs Every 6 (Six) Hours As Needed for Wheezing. 18 g 0   • Cholecalciferol (Vitamin D) 50 MCG (2000 UT) tablet Take 2,000 Units by mouth Daily. 90 tablet 1   • glucose monitor monitoring kit Use as directed, E11.9 1 each 0   • Melatonin 1 MG chewable tablet Chew 20 mg Every Night.     • [DISCONTINUED] Glucose Blood (Blood Glucose Test) strip Use with glucometer daily as directed, E11.9 100 each 1   • [DISCONTINUED] Lancet Device misc Use with glucometer daily as directed, E11.9 100 each 1   • hydrOXYzine pamoate (Vistaril) 25 MG capsule Take 1 capsule by mouth At Night As Needed for Anxiety (sleep). 30 capsule 1   • metFORMIN (Glucophage) 500 MG tablet Take 1 tablet by mouth Daily With Breakfast. 30 tablet 2   • [DISCONTINUED] famotidine (PEPCID) 20 MG tablet Take 1 tablet by mouth 2 (Two) Times a Day. 14 tablet 0   • [DISCONTINUED] Hydrocortisone, Perianal, (Anusol-HC) 2.5 % rectal cream Insert  into the rectum 2 (Two) Times a Day. 28 g 0   • [DISCONTINUED] losartan (Cozaar) 50 MG tablet Take 1 tablet by mouth Daily. 30 tablet 2   • [DISCONTINUED] predniSONE (DELTASONE) 20 MG tablet Take 1 tablet by mouth Daily. 5 tablet 0     No current facility-administered medications on file prior to visit.     New Medications Ordered This Visit   Medications   • guaiFENesin (Mucinex) 600 MG 12 hr tablet     Sig: Take 2 tablets by mouth 2 (Two) Times a Day.     Dispense:  24 tablet     Refill:  0   • doxycycline (ADOXA) 100 MG tablet     Sig: Take 1 tablet by mouth 2 (Two) Times a Day for 7 days.     Dispense:  14 tablet     Refill:  0   • vitamin B-12 (CYANOCOBALAMIN) 1000 MCG tablet     Sig: Take 1 tablet by mouth  "Daily.     Dispense:  30 tablet     Refill:  2   • famotidine (PEPCID) 20 MG tablet     Sig: Take 1 tablet by mouth 2 (Two) Times a Day.     Dispense:  60 tablet     Refill:  2   • Glucose Blood (Blood Glucose Test) strip     Sig: Use with glucometer BID as directed, E11.9     Dispense:  100 each     Refill:  11   • Lancet Device misc     Sig: Use with glucometer BID as directed, E11.9     Dispense:  100 each     Refill:  11     Social History     Tobacco Use   Smoking Status Current Every Day Smoker   • Packs/day: 1.00   • Years: 15.00   • Pack years: 15.00   • Types: Cigarettes   Smokeless Tobacco Never Used        Objective   Vital Signs:   Vitals:    09/19/22 1126   BP: 136/78   Pulse: 74   Resp: 20   Temp: 97.9 °F (36.6 °C)   TempSrc: Temporal   SpO2: 98%   Weight: (!) 147 kg (324 lb)   Height: 172.7 cm (68\")   PainSc: 0-No pain      Physical Exam  Vitals reviewed.   Constitutional:       General: She is not in acute distress.     Appearance: Normal appearance. She is obese.   HENT:      Head: Normocephalic and atraumatic.   Eyes:      General: No scleral icterus.     Extraocular Movements: Extraocular movements intact.      Conjunctiva/sclera: Conjunctivae normal.   Cardiovascular:      Rate and Rhythm: Normal rate and regular rhythm.      Heart sounds: Normal heart sounds. No murmur heard.  Pulmonary:      Effort: Pulmonary effort is normal. No respiratory distress.      Breath sounds: Normal breath sounds. No stridor. No wheezing or rhonchi.   Musculoskeletal:      Cervical back: Normal range of motion and neck supple.   Skin:     General: Skin is warm and dry.      Coloration: Skin is not jaundiced.   Neurological:      General: No focal deficit present.      Mental Status: She is alert and oriented to person, place, and time.      Gait: Gait normal.   Psychiatric:         Mood and Affect: Mood normal.         Behavior: Behavior normal.        No LMP recorded.    Result Review :                   Assessment " and Plan    Diagnoses and all orders for this visit:    1. Type 2 diabetes mellitus without complication, without long-term current use of insulin (HCC) (Primary)  Assessment & Plan:  Diabetes is improving with lifestyle modifications.   Dietary recommendations for ADA diet.  Regular aerobic exercise.  Diabetes will be reassessed in 2 mo with A1c. Doing great with diet changes, fasting sugars in prediabetic range w/o metformin. Keep up the good work! Check sugar 2-3 x a week fasting.     Orders:  -     Glucose Blood (Blood Glucose Test) strip; Use with glucometer BID as directed, E11.9  Dispense: 100 each; Refill: 11  -     Lancet Device misc; Use with glucometer BID as directed, E11.9  Dispense: 100 each; Refill: 11    2. Vitamin D deficiency  Assessment & Plan:  Cont daily vit D      3. RUQ pain  Assessment & Plan:  Order u/s of gallbladder    Orders:  -     US Abdomen Limited; Future    4. Gastroesophageal reflux disease without esophagitis  Assessment & Plan:  Chronic, worsening. Restart pepcid, f/u with GI    Orders:  -     famotidine (PEPCID) 20 MG tablet; Take 1 tablet by mouth 2 (Two) Times a Day.  Dispense: 60 tablet; Refill: 2    5. Bronchitis  Assessment & Plan:  Tx w/doxy, mucinex, f/u if sx are not resolving or worsen.    Orders:  -     guaiFENesin (Mucinex) 600 MG 12 hr tablet; Take 2 tablets by mouth 2 (Two) Times a Day.  Dispense: 24 tablet; Refill: 0  -     doxycycline (ADOXA) 100 MG tablet; Take 1 tablet by mouth 2 (Two) Times a Day for 7 days.  Dispense: 14 tablet; Refill: 0    Other orders  -     vitamin B-12 (CYANOCOBALAMIN) 1000 MCG tablet; Take 1 tablet by mouth Daily.  Dispense: 30 tablet; Refill: 2      Follow Up   Return in about 2 months (around 11/19/2022) for A1c DMII.    Follow up if symptoms worsen or persist or has new or concerning symptoms, go to ER for severe symptoms.   Reviewed common medication effects and side effects and advised to report side effects immediately.  Encouraged  medication compliance and the importance of keeping scheduled follow up appointments with me and any other providers.  If a referral was made please contact our office if you have not heard about an appointment in the next 2 weeks.   If labs or images are ordered we will contact you with the results within the next week.  If you have not heard from us after a week please call our office to inquire about the results.   Patient was given instructions and counseling regarding her condition or for health maintenance advice. Please see specific information pulled into the AVS if appropriate.     Radha Nicole PA-C    * Please note that portions of this note were completed with a voice recognition program.

## 2022-09-19 NOTE — ASSESSMENT & PLAN NOTE
Diabetes is improving with lifestyle modifications.   Dietary recommendations for ADA diet.  Regular aerobic exercise.  Diabetes will be reassessed in 2 mo with A1c. Doing great with diet changes, fasting sugars in prediabetic range w/o metformin. Keep up the good work! Check sugar 2-3 x a week fasting.

## 2022-09-21 DIAGNOSIS — F41.9 ANXIETY: ICD-10-CM

## 2022-09-21 DIAGNOSIS — F41.0 PANIC: ICD-10-CM

## 2022-09-22 RX ORDER — HYDROXYZINE PAMOATE 25 MG/1
25 CAPSULE ORAL NIGHTLY PRN
Qty: 30 CAPSULE | Refills: 0 | Status: SHIPPED | OUTPATIENT
Start: 2022-09-22 | End: 2022-11-30

## 2022-09-29 ENCOUNTER — HOSPITAL ENCOUNTER (OUTPATIENT)
Dept: DIABETES SERVICES | Facility: HOSPITAL | Age: 32
Setting detail: RECURRING SERIES
Discharge: HOME | End: 2022-09-29

## 2022-09-29 NOTE — CONSULTS
Patient attended the scheduled 30 minute diabetes education class follow up phone call at no charge. Please see media tab for assessment and notes if you use EPIC. If you are not an EPIC user a copy of patient's assessment and notes will be sent per routine. Thank you.

## 2022-10-12 ENCOUNTER — HOSPITAL ENCOUNTER (EMERGENCY)
Facility: HOSPITAL | Age: 32
Discharge: LEFT BEFORE DISCHARGE | End: 2022-10-12

## 2022-10-12 PROCEDURE — 99211 OFF/OP EST MAY X REQ PHY/QHP: CPT

## 2022-10-31 ENCOUNTER — OFFICE VISIT (OUTPATIENT)
Dept: OBSTETRICS AND GYNECOLOGY | Facility: CLINIC | Age: 32
End: 2022-10-31

## 2022-10-31 ENCOUNTER — HOSPITAL ENCOUNTER (OUTPATIENT)
Dept: ULTRASOUND IMAGING | Facility: HOSPITAL | Age: 32
End: 2022-10-31

## 2022-10-31 VITALS — WEIGHT: 293 LBS | BODY MASS INDEX: 44.41 KG/M2 | HEIGHT: 68 IN

## 2022-10-31 DIAGNOSIS — Z01.419 WOMEN'S ANNUAL ROUTINE GYNECOLOGICAL EXAMINATION: Primary | ICD-10-CM

## 2022-10-31 DIAGNOSIS — Z11.3 SCREENING FOR STDS (SEXUALLY TRANSMITTED DISEASES): ICD-10-CM

## 2022-10-31 DIAGNOSIS — Z20.2 EXPOSURE TO STD: ICD-10-CM

## 2022-10-31 PROCEDURE — 99395 PREV VISIT EST AGE 18-39: CPT | Performed by: NURSE PRACTITIONER

## 2022-10-31 PROCEDURE — 2014F MENTAL STATUS ASSESS: CPT | Performed by: NURSE PRACTITIONER

## 2022-10-31 PROCEDURE — 3008F BODY MASS INDEX DOCD: CPT | Performed by: NURSE PRACTITIONER

## 2022-10-31 RX ORDER — MULTIPLE VITAMINS W/ MINERALS TAB 9MG-400MCG
1 TAB ORAL DAILY
COMMUNITY

## 2022-10-31 RX ORDER — IBUPROFEN 200 MG
200 TABLET ORAL EVERY 6 HOURS PRN
COMMUNITY

## 2022-10-31 NOTE — PROGRESS NOTES
Gynecologic Annual Exam Note        Gynecologic Exam and Establish Care        Subjective     HPI  Geeta Arteaga is a 32 y.o.  female who presents for annual well woman exam as a new patient.  Patient reports problems with: Patient reports that she had Covid in 2022 and since then she has been having 2 periods per month that are 2 weeks apart lasting for 7 days. She reports she was diagnosed with Diabetes following Covid and lost 50 lbs to help control her diabetes. She reports she has been having hot flashes and night sweats for the past month. She reports frequent headaches for the past months that go away only during her periods.. Patient's last menstrual period was 10/20/2022 (exact date). Her periods are irregular occurring every 2 weeks, lasting 7 days. The flow is moderate. Dysmenorrhea:moderate, occurring throughout menses. .     Partner Status: Marital Status: .  She is sexually active. She has not had new partners. STD testing recommendations have been explained to the patient and she does desire STD testing.     Additional OB/GYN History   Current contraception: contraceptive methods: None  Desires to: do not start contraception      Last Pap :3/29/21  . Results: negative. HPV: not done  Last Completed Pap Smear          Ordered - PAP SMEAR (Every 3 Years) Ordered on 10/31/2022    2021  SCANNED - PAP SMEAR                 History of abnormal Pap smear: no  Gardasil status:completed  Family history of uterine, colon, breast, or ovarian cancer: no  Performs monthly Self-Breast Exam: yes  Exercises Regularly:yes  Feelings of Anxiety or Depression: yes - anxiety   Tobacco Usage?: No       Current Outpatient Medications:   •  albuterol sulfate  (90 Base) MCG/ACT inhaler, Inhale 2 puffs Every 6 (Six) Hours As Needed for Wheezing., Disp: 18 g, Rfl: 0  •  Glucose Blood (Blood Glucose Test) strip, Use with glucometer BID as directed, E11.9, Disp: 100 each, Rfl: 11  •   glucose monitor monitoring kit, Use as directed, E11.9, Disp: 1 each, Rfl: 0  •  ibuprofen (ADVIL,MOTRIN) 200 MG tablet, Take 1 tablet by mouth Every 6 (Six) Hours As Needed for Mild Pain., Disp: , Rfl:   •  Lancet Device misc, Use with glucometer BID as directed, E11.9, Disp: 100 each, Rfl: 11  •  multivitamin with minerals tablet tablet, Take 1 tablet by mouth Daily., Disp: , Rfl:   •  Cholecalciferol (Vitamin D) 50 MCG (2000 UT) tablet, Take 2,000 Units by mouth Daily., Disp: 90 tablet, Rfl: 1  •  famotidine (PEPCID) 20 MG tablet, Take 1 tablet by mouth 2 (Two) Times a Day., Disp: 60 tablet, Rfl: 2  •  guaiFENesin (Mucinex) 600 MG 12 hr tablet, Take 2 tablets by mouth 2 (Two) Times a Day., Disp: 24 tablet, Rfl: 0  •  hydrOXYzine pamoate (VISTARIL) 25 MG capsule, TAKE 1 CAPSULE BY MOUTH AT NIGHT AS NEEDED FOR ANXIETY (SLEEP)., Disp: 30 capsule, Rfl: 0  •  Melatonin 1 MG chewable tablet, Chew 20 mg Every Night., Disp: , Rfl:   •  metFORMIN (Glucophage) 500 MG tablet, Take 1 tablet by mouth Daily With Breakfast., Disp: 30 tablet, Rfl: 2  •  vitamin B-12 (CYANOCOBALAMIN) 1000 MCG tablet, Take 1 tablet by mouth Daily., Disp: 30 tablet, Rfl: 2     Patient denies the need for medication refills today.    OB History        0    Para   0    Term   0       0    AB   0    Living   0       SAB   0    IAB   0    Ectopic   0    Molar   0    Multiple   0    Live Births   0                Health Maintenance   Topic Date Due   • URINE MICROALBUMIN  Never done   • COVID-19 Vaccine (1) Never done   • ANNUAL PHYSICAL  Never done   • Pneumococcal Vaccine 0-64 (1 - PCV) Never done   • TDAP/TD VACCINES (1 - Tdap) Never done   • HEPATITIS C SCREENING  Never done   • DIABETIC FOOT EXAM  Never done   • DIABETIC EYE EXAM  Never done   • Annual Gynecologic Pelvic and Breast Exam  2022   • INFLUENZA VACCINE  Never done   • HEMOGLOBIN A1C  2023   • PAP SMEAR  2024   • Hepatitis B  Completed       Past Medical  "History:   Diagnosis Date   • Anxiety    • Depression    • Diabetes (HCC)     type 2   • PCOS (polycystic ovarian syndrome)         Past Surgical History:   Procedure Laterality Date   • WISDOM TOOTH EXTRACTION         The additional following portions of the patient's history were reviewed and updated as appropriate: allergies, current medications, past family history, past medical history, past social history, past surgical history and problem list.    Review of Systems   Constitutional: Negative.    Cardiovascular: Negative.    Gastrointestinal: Negative.    Endocrine: Positive for heat intolerance (hot flashes and nightsweats).   Genitourinary: Positive for menstrual problem.   Neurological: Positive for headache.   Psychiatric/Behavioral: Negative.          I have reviewed and agree with the HPI, ROS, and historical information as entered above. Aaron MARTINEZ Cherrie, APRN        Objective   Ht 172.7 cm (68\")   Wt (!) 141 kg (311 lb 9.6 oz)   LMP 10/20/2022 (Exact Date)   BMI 47.38 kg/m²     Physical Exam  Vitals and nursing note reviewed. Exam conducted with a chaperone present.   Constitutional:       Appearance: Normal appearance. She is well-developed. She is obese.   HENT:      Head: Normocephalic and atraumatic.   Neck:      Thyroid: No thyroid mass or thyromegaly.   Cardiovascular:      Rate and Rhythm: Normal rate and regular rhythm.      Heart sounds: No murmur heard.  Pulmonary:      Effort: Pulmonary effort is normal. No retractions.      Breath sounds: Normal breath sounds. No wheezing, rhonchi or rales.   Chest:      Chest wall: No mass or tenderness.   Breasts:     Right: Normal. No mass, nipple discharge, skin change or tenderness.      Left: Normal. No mass, nipple discharge, skin change or tenderness.   Abdominal:      Palpations: Abdomen is soft. Abdomen is not rigid. There is no mass.      Tenderness: There is no abdominal tenderness. There is no guarding.      Hernia: No hernia is " present.   Genitourinary:     General: Normal vulva.      Exam position: Lithotomy position.      Labia:         Right: No rash, tenderness or lesion.         Left: No rash, tenderness or lesion.       Vagina: Normal. No vaginal discharge or lesions.      Cervix: No cervical motion tenderness, discharge, lesion or cervical bleeding.      Uterus: Normal. Not enlarged, not fixed and not tender.       Adnexa: Right adnexa normal and left adnexa normal.        Right: No mass or tenderness.          Left: No mass or tenderness.        Rectum: Normal. No external hemorrhoid.   Musculoskeletal:      Cervical back: Normal range of motion. No muscular tenderness.   Neurological:      Mental Status: She is alert and oriented to person, place, and time.   Psychiatric:         Behavior: Behavior normal.            Assessment and Plan    Problem List Items Addressed This Visit    None  Visit Diagnoses     Women's annual routine gynecological examination    -  Primary    Relevant Orders    LIQUID-BASED PAP SMEAR, [facility] ([facility])    Screening for STDs (sexually transmitted diseases)        Relevant Orders    LIQUID-BASED PAP SMEAR, [facility] ([facility])    HIV-1 / O / 2 Ag / Antibody 4th Generation    Hepatitis Panel, Acute    [facility]    Exposure to STD        Relevant Orders    HIV-1 / O / 2 Ag / Antibody 4th Generation    Hepatitis Panel, Acute    [facility]          1. GYN annual well woman exam.   2. Reviewed pap guidelines. Pap with STD testing today. Labs pending.  3. Will try course of ocp for 2-3 months to regulate her periods. Teaching sheet reviewed and pt v/u.  4. Encouraged use of condoms for STD prevention.  5. OCP's/Vaginal Ring - Discussed side effects of nausea, BTB, headaches, breast tenderness and slight weight gain in the first three cycles.  Understands risks of blood clots, stroke, and theoretical risk of breast cancer.  Denies family history of blood clots.  6. Recommended use of Vitamin D replacement and  getting adequate calcium in her diet. (1500mg)  7. Reviewed monthly self breast exams.  Instructed to call with lumps, pain, or breast discharge.    8. Reviewed BMI and weight loss as preventative health measures.   9. Reviewed exercise as a preventative health measures.   10. Reccommended Flu Vaccine in Fall of each year.  11. RTC in 1 year or PRN with problems      Aaron Grier, APRN  10/31/2022

## 2022-11-01 LAB
HAV IGM SERPL QL IA: NEGATIVE
HBV CORE IGM SERPL QL IA: NEGATIVE
HBV SURFACE AG SERPL QL IA: NEGATIVE
HCV AB S/CO SERPL IA: <0.1 S/CO RATIO (ref 0–0.9)
HCV AB SERPL QL IA: NORMAL
HIV 1+2 AB+HIV1 P24 AG SERPL QL IA: NON REACTIVE
RPR SER QL: NON REACTIVE

## 2022-11-02 LAB — REF LAB TEST METHOD: NORMAL

## 2022-11-03 DIAGNOSIS — B37.49 CANDIDA INFECTION OF GENITAL REGION: Primary | ICD-10-CM

## 2022-11-03 RX ORDER — FLUCONAZOLE 150 MG/1
150 TABLET ORAL DAILY
Qty: 2 TABLET | Refills: 0 | Status: SHIPPED | OUTPATIENT
Start: 2022-11-03 | End: 2022-11-21

## 2022-11-11 ENCOUNTER — TELEMEDICINE (OUTPATIENT)
Dept: FAMILY MEDICINE CLINIC | Facility: TELEHEALTH | Age: 32
End: 2022-11-11

## 2022-11-11 DIAGNOSIS — J22 LOWER RESPIRATORY INFECTION (E.G., BRONCHITIS, PNEUMONIA, PNEUMONITIS, PULMONITIS): Primary | ICD-10-CM

## 2022-11-11 PROCEDURE — 99213 OFFICE O/P EST LOW 20 MIN: CPT | Performed by: NURSE PRACTITIONER

## 2022-11-11 RX ORDER — BUTALBITAL, ACETAMINOPHEN AND CAFFEINE 300; 40; 50 MG/1; MG/1; MG/1
CAPSULE ORAL
COMMUNITY
Start: 2022-10-12 | End: 2022-11-21

## 2022-11-11 RX ORDER — PREDNISONE 20 MG/1
20 TABLET ORAL 2 TIMES DAILY
Qty: 10 TABLET | Refills: 0 | Status: SHIPPED | OUTPATIENT
Start: 2022-11-11 | End: 2022-11-16

## 2022-11-11 RX ORDER — DEXTROMETHORPHAN HYDROBROMIDE AND PROMETHAZINE HYDROCHLORIDE 15; 6.25 MG/5ML; MG/5ML
5 SYRUP ORAL 4 TIMES DAILY PRN
Qty: 150 ML | Refills: 0 | Status: SHIPPED | OUTPATIENT
Start: 2022-11-11 | End: 2022-11-21

## 2022-11-11 RX ORDER — AMOXICILLIN AND CLAVULANATE POTASSIUM 875; 125 MG/1; MG/1
1 TABLET, FILM COATED ORAL 2 TIMES DAILY
Qty: 20 TABLET | Refills: 0 | Status: SHIPPED | OUTPATIENT
Start: 2022-11-11 | End: 2022-11-21

## 2022-11-11 RX ORDER — LANCETS 33 GAUGE
EACH MISCELLANEOUS
COMMUNITY
Start: 2022-11-04

## 2022-11-11 RX ORDER — PROMETHAZINE HYDROCHLORIDE 25 MG/1
25 TABLET ORAL EVERY 6 HOURS PRN
COMMUNITY
Start: 2022-10-12 | End: 2022-11-21

## 2022-11-11 NOTE — PATIENT INSTRUCTIONS
Acute Bronchitis, Adult  Acute bronchitis is sudden inflammation of the main airways (bronchi) that come off the windpipe (trachea) in the lungs. The swelling causes the airways to get smaller and make more mucus than normal. This can make it hard to breathe and can cause coughing or noisy breathing (wheezing).  Acute bronchitis may last several weeks. The cough may last longer. Allergies, asthma, and exposure to smoke may make the condition worse.  What are the causes?  This condition can be caused by germs and by substances that irritate the lungs, including:  Cold and flu viruses. The most common cause of this condition is the virus that causes the common cold.  Bacteria. This is less common.  Breathing in substances that irritate the lungs, including:  Smoke from cigarettes and other forms of tobacco.  Dust and pollen.  Fumes from household cleaning products, gases, or burned fuel.  Indoor or outdoor air pollution.  What increases the risk?  The following factors may make you more likely to develop this condition:  A weak body's defense system, also called the immune system.  A condition that affects your lungs and breathing, such as asthma.  What are the signs or symptoms?  Common symptoms of this condition include:  Coughing. This may bring up clear, yellow, or green mucus from your lungs (sputum).  Wheezing.  Runny or stuffy nose.  Having too much mucus in your lungs (chest congestion).  Shortness of breath.  Aches and pains, including sore throat or chest.  How is this diagnosed?  This condition is usually diagnosed based on:  Your symptoms and medical history.  A physical exam.  You may also have other tests, including tests to rule out other conditions, such as pneumonia. These tests include:  A test of lung function.  Test of a mucus sample to look for the presence of bacteria.  Tests to check the oxygen level in your blood.  Blood tests.  Chest X-ray.  How is this treated?  Most cases of acute bronchitis  clear up over time without treatment. Your health care provider may recommend:  Drinking more fluids to help thin your mucus so it is easier to cough up.  Taking inhaled medicine (inhaler) to improve air flow in and out of your lungs.  Using a vaporizer or a humidifier. These are machines that add water to the air to help you breathe better.  Taking a medicine that thins mucus and clears congestion (expectorant).  Taking a medicine that prevents or stops coughing (cough suppressant).  It is notcommon to take an antibiotic medicine for this condition.  Follow these instructions at home:    Take over-the-counter and prescription medicines only as told by your health care provider.  Use an inhaler, vaporizer, or humidifier as told by your health care provider.  Take two teaspoons (10 mL) of honey at bedtime to lessen coughing at night.  Drink enough fluid to keep your urine pale yellow.  Do not use any products that contain nicotine or tobacco. These products include cigarettes, chewing tobacco, and vaping devices, such as e-cigarettes. If you need help quitting, ask your health care provider.  Get plenty of rest.  Return to your normal activities as told by your health care provider. Ask your health care provider what activities are safe for you.  Keep all follow-up visits. This is important.  How is this prevented?  To lower your risk of getting this condition again:  Wash your hands often with soap and water for at least 20 seconds. If soap and water are not available, use hand .  Avoid contact with people who have cold symptoms.  Try not to touch your mouth, nose, or eyes with your hands.  Avoid breathing in smoke or chemical fumes. Breathing smoke or chemical fumes will make your condition worse.  Get the flu shot every year.  Contact a health care provider if:  Your symptoms do not improve after 2 weeks.  You have trouble coughing up the mucus.  Your cough keeps you awake at night.  You have a  fever.  Get help right away if you:  Cough up blood.  Feel pain in your chest.  Have severe shortness of breath.  Faint or keep feeling like you are going to faint.  Have a severe headache.  Have a fever or chills that get worse.  These symptoms may represent a serious problem that is an emergency. Do not wait to see if the symptoms will go away. Get medical help right away. Call your local emergency services (911 in the [place] Do not drive yourself to the hospital.  Summary  Acute bronchitis is inflammation of the main airways (bronchi) that come off the windpipe (trachea) in the lungs. The swelling causes the airways to get smaller and make more mucus than normal.  Drinking more fluids can help thin your mucus so it is easier to cough up.  Take over-the-counter and prescription medicines only as told by your health care provider.  Do not use any products that contain nicotine or tobacco. These products include cigarettes, chewing tobacco, and vaping devices, such as e-cigarettes. If you need help quitting, ask your health care provider.  Contact a health care provider if your symptoms do not improve after 2 weeks.  This information is not intended to replace advice given to you by your health care provider. Make sure you discuss any questions you have with your health care provider.  Document Revised: 04/20/2022 Document Reviewed: 04/20/2022  Elsevier Patient Education © 2022 [employer]

## 2022-11-11 NOTE — PROGRESS NOTES
You have chosen to receive care through a telehealth visit.  Do you consent to use a video/audio connection for your medical care today? Yes     CHIEF COMPLAINT  No chief complaint on file.        HPI  Geeta Arteaga is a 32 y.o. female  presents with complaint of 4 weeks history of headache, chest congestion, productive cough with yellow sputum, wheezing, chest discomfort with cough.  Denies fever.  Has been treated for bronchitis but is not feeling any better.     Review of Systems   See HPI    Past Medical History:   Diagnosis Date   • Anxiety    • Depression    • Diabetes (HCC)     type 2   • PCOS (polycystic ovarian syndrome)        Family History   Problem Relation Age of Onset   • Drug abuse Mother    • Alcohol abuse Mother    • Drug abuse Father    • Alcohol abuse Father        Social History     Socioeconomic History   • Marital status:    Tobacco Use   • Smoking status: Every Day     Packs/day: 1.00     Years: 15.00     Pack years: 15.00     Types: Cigarettes   • Smokeless tobacco: Never   Vaping Use   • Vaping Use: Never used   Substance and Sexual Activity   • Alcohol use: Not Currently   • Drug use: No   • Sexual activity: Yes     Partners: Male     Birth control/protection: None       Geeta Arteaga  reports that she has been smoking cigarettes. She has a 15.00 pack-year smoking history. She has never used smokeless tobacco.              LMP 10/20/2022 (Exact Date)     PHYSICAL EXAM  Physical Exam   Constitutional: She is oriented to person, place, and time. She appears well-developed and well-nourished. She does not have a sickly appearance. She does not appear ill.   HENT:   Head: Normocephalic and atraumatic.   Pulmonary/Chest: Effort normal.  No respiratory distress (persistent cough during visit; no audible wheezing noted).  Neurological: She is alert and oriented to person, place, and time.         Diagnoses and all orders for this visit:    1. Lower respiratory infection  (e.g., bronchitis, pneumonia, pneumonitis, pulmonitis) (Primary)  -     amoxicillin-clavulanate (AUGMENTIN) 875-125 MG per tablet; Take 1 tablet by mouth 2 (Two) Times a Day for 10 days.  Dispense: 20 tablet; Refill: 0  -     predniSONE (DELTASONE) 20 MG tablet; Take 1 tablet by mouth 2 (Two) Times a Day for 5 days.  Dispense: 10 tablet; Refill: 0  -     promethazine-dextromethorphan (PROMETHAZINE-DM) 6.25-15 MG/5ML syrup; Take 5 mL by mouth 4 (Four) Times a Day As Needed for Cough.  Dispense: 150 mL; Refill: 0    --take medications as prescribed  --increase fluids, rest, tylenol or ibuprofen for pain, continue albuterol inhaler every 4-6 hours as needed for wheezing  --f/u in 3-5 days if no improvement      FOLLOW-UP  As discussed during visit with PCP/[facility] if no improvement or Urgent Care/Emergency Department if worsening of symptoms    Patient verbalizes understanding of medication dosage, comfort measures, instructions for treatment and follow-up.    Nori Guerra, APRN  11/11/2022  16:59 EST    The use of a video visit has been reviewed with the patient and verbal informed consent has been obtained. Myself and Geeta Arteaga participated in this visit. The patient is located in [address] [place] [address].    I am located in [place], [place]. Mychart and Zoom were utilized. I spent 9 minutes in the patient's chart for this visit.

## 2022-11-21 ENCOUNTER — OFFICE VISIT (OUTPATIENT)
Dept: INTERNAL MEDICINE | Facility: CLINIC | Age: 32
End: 2022-11-21

## 2022-11-21 ENCOUNTER — TELEPHONE (OUTPATIENT)
Dept: INTERNAL MEDICINE | Facility: CLINIC | Age: 32
End: 2022-11-21

## 2022-11-21 VITALS
WEIGHT: 293 LBS | OXYGEN SATURATION: 92 % | HEART RATE: 52 BPM | HEIGHT: 68 IN | TEMPERATURE: 97.8 F | BODY MASS INDEX: 44.41 KG/M2 | SYSTOLIC BLOOD PRESSURE: 138 MMHG | DIASTOLIC BLOOD PRESSURE: 86 MMHG

## 2022-11-21 DIAGNOSIS — E11.9 TYPE 2 DIABETES MELLITUS WITHOUT COMPLICATION, WITHOUT LONG-TERM CURRENT USE OF INSULIN: Primary | ICD-10-CM

## 2022-11-21 DIAGNOSIS — G44.52 NEW DAILY PERSISTENT HEADACHE: ICD-10-CM

## 2022-11-21 LAB
EXPIRATION DATE: ABNORMAL
HBA1C MFR BLD: 5.8 %
Lab: ABNORMAL

## 2022-11-21 PROCEDURE — 99214 OFFICE O/P EST MOD 30 MIN: CPT | Performed by: FAMILY MEDICINE

## 2022-11-21 PROCEDURE — 83036 HEMOGLOBIN GLYCOSYLATED A1C: CPT | Performed by: FAMILY MEDICINE

## 2022-11-21 PROCEDURE — 3044F HG A1C LEVEL LT 7.0%: CPT | Performed by: FAMILY MEDICINE

## 2022-11-21 RX ORDER — MAGNESIUM OXIDE 400 MG/1
400 TABLET ORAL DAILY
Qty: 30 TABLET | Refills: 2 | Status: SHIPPED | OUTPATIENT
Start: 2022-11-21

## 2022-11-21 NOTE — TELEPHONE ENCOUNTER
Records request       ----- Message from Lisa Herndon MA sent at 11/21/2022 11:43 AM EST -----  Regarding: FW: eye check date    ----- Message -----  From: Marleny Mathis MD  Sent: 11/21/2022  10:38 AM EST  To: [facility] [address] Clinical Pool  Subject: eye check date                                   Check [facility] on [facility] for eye check info

## 2022-11-21 NOTE — TELEPHONE ENCOUNTER
Record requested     ----- Message from Lisa Herndon MA sent at 11/21/2022 11:44 AM EST -----  Regarding: FW: mri head report    ----- Message -----  From: Marleny Mathis MD  Sent: 11/21/2022  10:53 AM EST  To: [facility] [address] Clinical Pool  Subject: mri head report                                  Please get MRI brain report last month [facility]

## 2022-11-21 NOTE — PROGRESS NOTES
"Subjective   Geeta Arteaga is a 32 y.o. female.     Chief Complaint   Patient presents with   • Diabetes       Visit Vitals  /86   Pulse 52   Temp 97.8 °F (36.6 °C)   Ht 172.7 cm (68\")   Wt (!) 140 kg (309 lb)   LMP 11/15/2022 (Exact Date)   SpO2 92%   Breastfeeding No   BMI 46.98 kg/m²         Diabetes  She presents for her follow-up diabetic visit. She has type 2 diabetes mellitus. No MedicAlert identification noted. The initial diagnosis of diabetes was made 4 Months ago. Hypoglycemia symptoms include headaches. Associated symptoms include visual change. Pertinent negatives for diabetes include no blurred vision, no chest pain, no fatigue, no foot paresthesias, no foot ulcerations, no polydipsia, no polyphagia, no polyuria, no weakness and no weight loss. There are no hypoglycemic complications. Symptoms are improving. Pertinent negatives for diabetic complications include no CVA, heart disease, nephropathy, peripheral neuropathy, PVD or retinopathy. Risk factors for coronary artery disease include obesity, stress and tobacco exposure. Current diabetic treatment includes diet. She is compliant with treatment most of the time. Her weight is decreasing steadily. She is following a generally healthy diet. Meal planning includes avoidance of concentrated sweets and carbohydrate counting. She has had a previous visit with a dietitian. She participates in exercise intermittently. She monitors blood glucose at home 3-4 x per week. Blood glucose monitoring compliance is fair. Her home blood glucose trend is decreasing rapidly. Her breakfast blood glucose range is generally  mg/dl. Her lunch blood glucose range is generally 110-130 mg/dl. Her dinner blood glucose range is generally 110-130 mg/dl. Her highest blood glucose is 110-130 mg/dl. Her overall blood glucose range is 110-130 mg/dl. An ACE inhibitor/angiotensin II receptor blocker is not being taken. She does not see a podiatrist.Eye exam is " current.   Headache  Headache pattern:  Some headache always there, and the pain level varies  Duration:  4 to 8 weeks  Frequency:  No headaches then constant pain started  Providers seen:  Primary care provider and eye doctor  Previous testing:  Blood work  Longest time without a headache:  Headache has always been present  Time of day symptoms are worse:  Night  Do headaches wake patient from sleep?: No    Days of the week symptoms are worse:  No specific day of the week  Season symptoms are worse:  No particular season  Quality:  Pulsating  Laterality:  Right side only  Location:  Temples/sides  Pain severity:  10 (5-10)  Aggravating factors:  Light (pt has stopped caffeine)  Triggers: wearing hair down.  Changes in thinking and mood:  None  Changes in vision:  Spots and lights  Changes in sensation:  Sensitivity to light  Abortive medications tried:  Ibuprofen     Pt has changed her diet and has been losing weight.  Pt has been to ER, eye doctor and Ob with daily headaches.  Pt is moving to [facility] soon.   Pt is checking her sugar a few times per week and sugar is less than 130.   Pt has stopped her metformin and is doing well with her blood sugar.  Pt has stopped losartan for allergic reaction.   Pt is not taking caffeine    Pt reports that she had a negative MRI brain.   The following portions of the patient's history were reviewed and updated as appropriate: allergies, current medications, past family history, past medical history, past social history, past surgical history and problem list.    Past Medical History:   Diagnosis Date   • ADHD (attention deficit hyperactivity disorder) 2000   • Anxiety    • Depression    • Diabetes (HCC)     type 2   • PCOS (polycystic ovarian syndrome)       Past Surgical History:   Procedure Laterality Date   • WISDOM TOOTH EXTRACTION        Family History   Problem Relation Age of Onset   • Drug abuse Mother    • Alcohol abuse Mother    • Anxiety disorder Mother    •  Depression Mother    • Drug abuse Father    • Alcohol abuse Father    • Anxiety disorder Sister    • Depression Sister    • Drug abuse Sister    • Anxiety disorder Sister       Social History     Socioeconomic History   • Marital status:    Tobacco Use   • Smoking status: Every Day     Packs/day: 1.00     Years: 15.00     Pack years: 15.00     Types: Cigarettes   • Smokeless tobacco: Never   Vaping Use   • Vaping Use: Never used   Substance and Sexual Activity   • Alcohol use: Never   • Drug use: Never   • Sexual activity: Yes     Partners: Male     Birth control/protection: None      Allergies   Allergen Reactions   • Sertraline Irritability     Suicidal thoughts   • Losartan Potassium Hives       Review of Systems   Constitutional: Negative for fatigue and weight loss.   Eyes: Negative for blurred vision.   Cardiovascular: Negative for chest pain.   Endocrine: Negative for polydipsia, polyphagia and polyuria.   Musculoskeletal: Positive for back pain.   Neurological: Positive for headaches. Negative for weakness.       Objective   Physical Exam  Vitals and nursing note reviewed.   Constitutional:       Appearance: She is well-developed.   HENT:      Head: Normocephalic.      Right Ear: External ear normal.      Left Ear: External ear normal.      Nose: Nose normal.   Eyes:      General: Lids are normal.      Conjunctiva/sclera: Conjunctivae normal.      Pupils: Pupils are equal, round, and reactive to light.   Neck:      Thyroid: No thyroid mass or thyromegaly.      Vascular: No carotid bruit.      Trachea: Trachea normal.   Cardiovascular:      Rate and Rhythm: Normal rate and regular rhythm.      Heart sounds: No murmur heard.  Pulmonary:      Effort: Pulmonary effort is normal. No respiratory distress.      Breath sounds: Normal breath sounds. No decreased breath sounds, wheezing, rhonchi or rales.   Chest:      Chest wall: No tenderness.   Abdominal:      General: Bowel sounds are normal.       Palpations: Abdomen is soft.      Tenderness: There is no abdominal tenderness.   Musculoskeletal:         General: Normal range of motion.      Cervical back: Normal range of motion and neck supple.   Skin:     General: Skin is warm and dry.   Neurological:      Mental Status: She is alert and oriented to person, place, and time.   Psychiatric:         Behavior: Behavior normal.         Assessment & Plan   Diagnoses and all orders for this visit:    1. Type 2 diabetes mellitus without complication, without long-term current use of insulin (HCC) (Primary)  -     POC Glycosylated Hemoglobin (Hb A1C)    2. New daily persistent headache  -     magnesium oxide (MAG-OX) 400 MG tablet; Take 1 tablet by mouth Daily.  Dispense: 30 tablet; Refill: 2    Marked improvement of diabetes.  Pt declines flu shot, Covid vaccine and pneumonia vaccine.    [facility] records re headache and MRI head     Trial of daily magnesium oxide for headache.    Current Outpatient Medications:   •  albuterol sulfate  (90 Base) MCG/ACT inhaler, Inhale 2 puffs Every 6 (Six) Hours As Needed for Wheezing., Disp: 18 g, Rfl: 0  •  Glucose Blood (Blood Glucose Test) strip, Use with glucometer BID as directed, E11.9, Disp: 100 each, Rfl: 11  •  glucose monitor monitoring kit, Use as directed, E11.9, Disp: 1 each, Rfl: 0  •  hydrOXYzine pamoate (VISTARIL) 25 MG capsule, TAKE 1 CAPSULE BY MOUTH AT NIGHT AS NEEDED FOR ANXIETY (SLEEP)., Disp: 30 capsule, Rfl: 0  •  ibuprofen (ADVIL,MOTRIN) 200 MG tablet, Take 1 tablet by mouth Every 6 (Six) Hours As Needed for Mild Pain., Disp: , Rfl:   •  Lancet Device mis, Use with glucometer BID as directed, E11.9, Disp: 100 each, Rfl: 11  •  Melatonin 1 MG chewable tablet, Chew 20 mg Every Night., Disp: , Rfl:   •  multivitamin with minerals tablet tablet, Take 1 tablet by mouth Daily., Disp: , Rfl:   •  famotidine (PEPCID) 20 MG tablet, Take 1 tablet by mouth 2 (Two) Times a Day., Disp: 60 tablet, Rfl: 2  •   Lancets (OneTouch Delica Plus [id number]) misc, , Disp: , Rfl:   •  magnesium oxide (MAG-OX) 400 MG tablet, Take 1 tablet by mouth Daily., Disp: 30 tablet, Rfl: 2    Return in about 6 months (around 5/21/2023), or if symptoms worsen or fail to improve, for Annual, Recheck DM.     Hemoglobin A1C   Date Value Ref Range Status   11/21/2022 5.8 % Final   08/18/2022 7.40 (H) 4.80 - 5.60 % Final

## 2022-11-30 RX ORDER — HYDROXYZINE PAMOATE 25 MG/1
25 CAPSULE ORAL NIGHTLY PRN
Qty: 30 CAPSULE | Refills: 0 | Status: SHIPPED | OUTPATIENT
Start: 2022-11-30 | End: 2022-12-28

## 2022-12-01 ENCOUNTER — TELEMEDICINE (OUTPATIENT)
Dept: INTERNAL MEDICINE | Facility: CLINIC | Age: 32
End: 2022-12-01

## 2022-12-01 DIAGNOSIS — F32.A DEPRESSION, UNSPECIFIED DEPRESSION TYPE: ICD-10-CM

## 2022-12-01 DIAGNOSIS — F41.9 ANXIETY: Primary | ICD-10-CM

## 2022-12-01 PROCEDURE — 99443 PR PHYS/QHP TELEPHONE EVALUATION 21-30 MIN: CPT | Performed by: FAMILY MEDICINE

## 2022-12-01 RX ORDER — QUETIAPINE FUMARATE 25 MG/1
25 TABLET, FILM COATED ORAL 2 TIMES DAILY
Qty: 60 TABLET | Refills: 0 | Status: SHIPPED | OUTPATIENT
Start: 2022-12-01 | End: 2022-12-30

## 2022-12-01 NOTE — PROGRESS NOTES
Subjective   Geeta Arteaga is a 32 y.o. female.     Chief Complaint   Patient presents with   • Anxiety   • Depression     You have chosen to receive care through a telehealth visit.  Do you consent to use a video/audio connection for your medical care today? Yes    This was an audio and video enabled telemedicine encounter.    Unable to complete visit using a video connection to the patient. A phone visit was used to complete this visits. Total time of discussion was 33 minutes.    Pt is at work in [place] and I am in my office in [facility].  Pt is in secure location. Pt denies pregnancy.   Visit Vitals  LMP 11/15/2022 (Exact Date)         Depression  Visit Type: follow-up  Patient presents with the following symptoms: depressed mood, excessive worry, feelings of hopelessness, feelings of worthlessness, insomnia, irritability, nervousness/anxiety, panic, psychomotor agitation and restlessness.  Patient is not experiencing: anhedonia, chest pain, choking sensation, compulsions, confusion, decreased concentration, dizziness, dry mouth, fatigue, hypersomnia, hyperventilation, malaise, memory impairment, muscle tension, nausea, obsessions, palpitations, psychomotor retardation, shortness of breath, suicidal ideas, suicidal planning, thoughts of death, weight gain and weight loss.  Frequency of symptoms: most days   Severity: moderate   Sleep quality: poor  Nighttime awakenings: many    Anxiety  Presents for follow-up visit. Symptoms include depressed mood, excessive worry, insomnia, irritability, nervous/anxious behavior, panic and restlessness. Patient reports no chest pain, compulsions, confusion, decreased concentration, dizziness, dry mouth, feeling of choking, hyperventilation, malaise, muscle tension, nausea, obsessions, palpitations, shortness of breath or suicidal ideas. Symptoms occur most days. The severity of symptoms is moderate. The quality of sleep is poor. Nighttime awakenings: several.     Her past  medical history is significant for depression. Compliance with medications is %.      Pt states that hydroxyzine is not working anymore.  Pt feels occasionally that she is a burden to everyone.  Pt is not sleeping. Pt is moving and that has her stressed.   Pt has hx of ADHD, bipolar and anxiety.  Pt took ADHD med as child.  Pt has been feeling bad since Covid in July.  Pt having problems getting out of the house.  Pt has tried ritalin prozac dexadrine.   Pt thinks that she has both anxiety and depression.     Pt is having 2 periods per month.   Identity confirmed with birthday.     Pt has pending behavioral health visit in February  The following portions of the patient's history were reviewed and updated as appropriate: allergies, current medications, past family history, past medical history, past social history, past surgical history and problem list.    Past Medical History:   Diagnosis Date   • ADHD (attention deficit hyperactivity disorder) 2000   • Anxiety    • Depression    • Diabetes (HCC)     type 2   • PCOS (polycystic ovarian syndrome)       Past Surgical History:   Procedure Laterality Date   • WISDOM TOOTH EXTRACTION        Family History   Problem Relation Age of Onset   • Drug abuse Mother    • Alcohol abuse Mother    • Anxiety disorder Mother    • Depression Mother    • Drug abuse Father    • Alcohol abuse Father    • Anxiety disorder Sister    • Depression Sister    • Drug abuse Sister    • Anxiety disorder Sister       Social History     Socioeconomic History   • Marital status:    Tobacco Use   • Smoking status: Every Day     Packs/day: 1.00     Years: 15.00     Pack years: 15.00     Types: Cigarettes   • Smokeless tobacco: Never   Vaping Use   • Vaping Use: Never used   Substance and Sexual Activity   • Alcohol use: Never   • Drug use: Never   • Sexual activity: Yes     Partners: Male     Birth control/protection: None      Allergies   Allergen Reactions   • Sertraline Irritability      Suicidal thoughts   • Losartan Potassium Hives       Review of Systems   Constitutional: Positive for irritability. Negative for weight gain and weight loss.   Respiratory: Negative for choking and shortness of breath.    Cardiovascular: Negative for chest pain and palpitations.   Gastrointestinal: Negative for nausea.   Neurological: Negative for dizziness.   Psychiatric/Behavioral: Positive for dysphoric mood. Negative for confusion, decreased concentration and suicidal ideas. The patient is nervous/anxious and has insomnia.        Objective   Physical Exam  Constitutional:       Comments: Telephone visit   Pulmonary:      Effort: Pulmonary effort is normal. No respiratory distress.   Neurological:      Mental Status: She is oriented to person, place, and time.   Psychiatric:         Mood and Affect: Mood normal.         Thought Content: Thought content normal.         Judgment: Judgment normal.         Assessment & Plan   Diagnoses and all orders for this visit:    1. Anxiety (Primary)  -     [facility] - Swab,; Future    2. Depression, unspecified depression type  -     [facility] - Swab,; Future    Other orders  -     QUEtiapine (SEROquel) 25 MG tablet; Take 1 tablet by mouth 2 (Two) Times a Day.  Dispense: 60 tablet; Refill: 0        Pt has had problems with multiple psych meds in the past from side effects to them being inaffective.  Discussed going to [facility] urgent care for behavioral health or ER if she is having suicidal thoughts or crisis.  Patient agreed.           Current Outpatient Medications:   •  albuterol sulfate  (90 Base) MCG/ACT inhaler, Inhale 2 puffs Every 6 (Six) Hours As Needed for Wheezing., Disp: 18 g, Rfl: 0  •  famotidine (PEPCID) 20 MG tablet, Take 1 tablet by mouth 2 (Two) Times a Day., Disp: 60 tablet, Rfl: 2  •  Glucose Blood (Blood Glucose Test) strip, Use with glucometer BID as directed, E11.9, Disp: 100 each, Rfl: 11  •  glucose monitor monitoring kit, Use as  directed, E11.9, Disp: 1 each, Rfl: 0  •  hydrOXYzine pamoate (VISTARIL) 25 MG capsule, TAKE 1 CAPSULE BY MOUTH AT NIGHT AS NEEDED FOR ANXIETY (SLEEP)., Disp: 30 capsule, Rfl: 0  •  ibuprofen (ADVIL,MOTRIN) 200 MG tablet, Take 1 tablet by mouth Every 6 (Six) Hours As Needed for Mild Pain., Disp: , Rfl:   •  Lancet Device misc, Use with glucometer BID as directed, E11.9, Disp: 100 each, Rfl: 11  •  Lancets (OneTouch Delica Plus [id number]) misc, , Disp: , Rfl:   •  magnesium oxide (MAG-OX) 400 MG tablet, Take 1 tablet by mouth Daily., Disp: 30 tablet, Rfl: 2  •  Melatonin 1 MG chewable tablet, Chew 20 mg Every Night., Disp: , Rfl:   •  multivitamin with minerals tablet tablet, Take 1 tablet by mouth Daily., Disp: , Rfl:   •  QUEtiapine (SEROquel) 25 MG tablet, Take 1 tablet by mouth 2 (Two) Times a Day., Disp: 60 tablet, Rfl: 0    Return in about 2 weeks (around 12/15/2022), or if symptoms worsen or fail to improve, for Recheck anxiety.     I spent 33 minutes caring for Geeta on this date of service. This time includes time spent by me in the following activities: preparing for the visit, reviewing tests, obtaining and/or reviewing a separately obtained history, performing a medically appropriate examination and/or evaluation, counseling and educating the patient/family/caregiver, ordering medications, tests, or procedures and documenting information in the medical record

## 2022-12-02 ENCOUNTER — TELEPHONE (OUTPATIENT)
Dept: INTERNAL MEDICINE | Facility: CLINIC | Age: 32
End: 2022-12-02

## 2022-12-28 RX ORDER — HYDROXYZINE PAMOATE 25 MG/1
25 CAPSULE ORAL NIGHTLY PRN
Qty: 30 CAPSULE | Refills: 3 | Status: SHIPPED | OUTPATIENT
Start: 2022-12-28

## 2022-12-28 NOTE — TELEPHONE ENCOUNTER
Rx Refill Note  Requested Prescriptions     Pending Prescriptions Disp Refills   • hydrOXYzine pamoate (VISTARIL) 25 MG capsule [Pharmacy Med Name: HYDROXYZINE VENKAT 25 MG CAP] 30 capsule 0     Sig: TAKE 1 CAPSULE BY MOUTH AT NIGHT AS NEEDED FOR ANXIETY (SLEEP).      Last filled: 11/30/2022  Last office visit with prescribing clinician: 11/21/2022      Next office visit with prescribing clinician: 5/22/2023 April NEELA Hamilton MA  12/28/22, 07:52 EST

## 2022-12-30 RX ORDER — QUETIAPINE FUMARATE 25 MG/1
TABLET, FILM COATED ORAL
Qty: 60 TABLET | Refills: 4 | Status: SHIPPED | OUTPATIENT
Start: 2022-12-30

## 2022-12-30 NOTE — TELEPHONE ENCOUNTER
Rx Refill Note  Requested Prescriptions     Pending Prescriptions Disp Refills   • QUEtiapine (SEROquel) 25 MG tablet [Pharmacy Med Name: QUETIAPINE FUMARATE 25 MG TAB] 60 tablet 0     Sig: TAKE 1 TABLET BY MOUTH TWICE A DAY      Last filled: 12/01/2022  Last office visit with prescribing clinician: 11/21/2022      Next office visit with prescribing clinician: 5/22/2023 April NEELA Hamilton MA  12/30/22, 07:53 EST

## 2023-09-22 DIAGNOSIS — E11.9 TYPE 2 DIABETES MELLITUS WITHOUT COMPLICATION, WITHOUT LONG-TERM CURRENT USE OF INSULIN: ICD-10-CM

## 2023-09-22 RX ORDER — BLOOD SUGAR DIAGNOSTIC
STRIP MISCELLANEOUS
Refills: 27 | OUTPATIENT
Start: 2023-09-22